# Patient Record
Sex: FEMALE | Race: WHITE | NOT HISPANIC OR LATINO | URBAN - METROPOLITAN AREA
[De-identification: names, ages, dates, MRNs, and addresses within clinical notes are randomized per-mention and may not be internally consistent; named-entity substitution may affect disease eponyms.]

---

## 2018-05-10 ENCOUNTER — EMERGENCY (EMERGENCY)
Facility: HOSPITAL | Age: 23
LOS: 1 days | Discharge: HOME | End: 2018-05-10
Attending: EMERGENCY MEDICINE
Payer: COMMERCIAL

## 2018-05-10 VITALS
SYSTOLIC BLOOD PRESSURE: 107 MMHG | HEART RATE: 82 BPM | OXYGEN SATURATION: 98 % | DIASTOLIC BLOOD PRESSURE: 75 MMHG | TEMPERATURE: 99 F | RESPIRATION RATE: 18 BRPM

## 2018-05-10 VITALS
DIASTOLIC BLOOD PRESSURE: 87 MMHG | HEART RATE: 100 BPM | HEIGHT: 67 IN | WEIGHT: 270.07 LBS | RESPIRATION RATE: 18 BRPM | OXYGEN SATURATION: 99 % | SYSTOLIC BLOOD PRESSURE: 124 MMHG | TEMPERATURE: 99 F

## 2018-05-10 LAB
ALBUMIN SERPL ELPH-MCNC: 4.1 G/DL — SIGNIFICANT CHANGE UP (ref 3.3–5.2)
ALP SERPL-CCNC: 81 U/L — SIGNIFICANT CHANGE UP (ref 40–120)
ALT FLD-CCNC: 13 U/L — SIGNIFICANT CHANGE UP
ANION GAP SERPL CALC-SCNC: 13 MMOL/L — SIGNIFICANT CHANGE UP (ref 5–17)
AST SERPL-CCNC: 13 U/L — SIGNIFICANT CHANGE UP
BASOPHILS # BLD AUTO: 0 K/UL — SIGNIFICANT CHANGE UP (ref 0–0.2)
BASOPHILS NFR BLD AUTO: 0.2 % — SIGNIFICANT CHANGE UP (ref 0–2)
BILIRUB SERPL-MCNC: 0.3 MG/DL — LOW (ref 0.4–2)
BUN SERPL-MCNC: 8 MG/DL — SIGNIFICANT CHANGE UP (ref 8–20)
CALCIUM SERPL-MCNC: 9.3 MG/DL — SIGNIFICANT CHANGE UP (ref 8.6–10.2)
CHLORIDE SERPL-SCNC: 100 MMOL/L — SIGNIFICANT CHANGE UP (ref 98–107)
CO2 SERPL-SCNC: 26 MMOL/L — SIGNIFICANT CHANGE UP (ref 22–29)
CREAT SERPL-MCNC: 0.59 MG/DL — SIGNIFICANT CHANGE UP (ref 0.5–1.3)
EOSINOPHIL # BLD AUTO: 0 K/UL — SIGNIFICANT CHANGE UP (ref 0–0.5)
EOSINOPHIL NFR BLD AUTO: 0 % — SIGNIFICANT CHANGE UP (ref 0–6)
GLUCOSE SERPL-MCNC: 93 MG/DL — SIGNIFICANT CHANGE UP (ref 70–115)
HCG SERPL-ACNC: <5 MIU/ML — SIGNIFICANT CHANGE UP
HCT VFR BLD CALC: 40.1 % — SIGNIFICANT CHANGE UP (ref 37–47)
HGB BLD-MCNC: 13.2 G/DL — SIGNIFICANT CHANGE UP (ref 12–16)
LYMPHOCYTES # BLD AUTO: 2.3 K/UL — SIGNIFICANT CHANGE UP (ref 1–4.8)
LYMPHOCYTES # BLD AUTO: 24.1 % — SIGNIFICANT CHANGE UP (ref 20–55)
MCHC RBC-ENTMCNC: 29.9 PG — SIGNIFICANT CHANGE UP (ref 27–31)
MCHC RBC-ENTMCNC: 32.9 G/DL — SIGNIFICANT CHANGE UP (ref 32–36)
MCV RBC AUTO: 90.9 FL — SIGNIFICANT CHANGE UP (ref 81–99)
MONOCYTES # BLD AUTO: 0.5 K/UL — SIGNIFICANT CHANGE UP (ref 0–0.8)
MONOCYTES NFR BLD AUTO: 5.6 % — SIGNIFICANT CHANGE UP (ref 3–10)
NEUTROPHILS # BLD AUTO: 6.8 K/UL — SIGNIFICANT CHANGE UP (ref 1.8–8)
NEUTROPHILS NFR BLD AUTO: 69.8 % — SIGNIFICANT CHANGE UP (ref 37–73)
PLATELET # BLD AUTO: 283 K/UL — SIGNIFICANT CHANGE UP (ref 150–400)
POTASSIUM SERPL-MCNC: 4.2 MMOL/L — SIGNIFICANT CHANGE UP (ref 3.5–5.3)
POTASSIUM SERPL-SCNC: 4.2 MMOL/L — SIGNIFICANT CHANGE UP (ref 3.5–5.3)
PROT SERPL-MCNC: 7.3 G/DL — SIGNIFICANT CHANGE UP (ref 6.6–8.7)
RBC # BLD: 4.41 M/UL — SIGNIFICANT CHANGE UP (ref 4.4–5.2)
RBC # FLD: 13.1 % — SIGNIFICANT CHANGE UP (ref 11–15.6)
SODIUM SERPL-SCNC: 139 MMOL/L — SIGNIFICANT CHANGE UP (ref 135–145)
WBC # BLD: 9.7 K/UL — SIGNIFICANT CHANGE UP (ref 4.8–10.8)
WBC # FLD AUTO: 9.7 K/UL — SIGNIFICANT CHANGE UP (ref 4.8–10.8)

## 2018-05-10 PROCEDURE — 29125 APPL SHORT ARM SPLINT STATIC: CPT

## 2018-05-10 PROCEDURE — 73110 X-RAY EXAM OF WRIST: CPT | Mod: 26,RT

## 2018-05-10 PROCEDURE — 73610 X-RAY EXAM OF ANKLE: CPT | Mod: 26,RT

## 2018-05-10 PROCEDURE — 73502 X-RAY EXAM HIP UNI 2-3 VIEWS: CPT | Mod: 26,RT

## 2018-05-10 PROCEDURE — 73562 X-RAY EXAM OF KNEE 3: CPT | Mod: 26,RT

## 2018-05-10 PROCEDURE — 72125 CT NECK SPINE W/O DYE: CPT | Mod: 26

## 2018-05-10 PROCEDURE — 70450 CT HEAD/BRAIN W/O DYE: CPT | Mod: 26

## 2018-05-10 PROCEDURE — 71045 X-RAY EXAM CHEST 1 VIEW: CPT | Mod: 26

## 2018-05-10 PROCEDURE — 99285 EMERGENCY DEPT VISIT HI MDM: CPT | Mod: 25

## 2018-05-10 PROCEDURE — 73030 X-RAY EXAM OF SHOULDER: CPT | Mod: 26,RT

## 2018-05-10 RX ORDER — TRAMADOL HYDROCHLORIDE 50 MG/1
50 TABLET ORAL ONCE
Qty: 0 | Refills: 0 | Status: DISCONTINUED | OUTPATIENT
Start: 2018-05-10 | End: 2018-05-10

## 2018-05-10 RX ORDER — FENTANYL CITRATE 50 UG/ML
50 INJECTION INTRAVENOUS ONCE
Qty: 0 | Refills: 0 | Status: DISCONTINUED | OUTPATIENT
Start: 2018-05-10 | End: 2018-05-10

## 2018-05-10 RX ORDER — METHOCARBAMOL 500 MG/1
2 TABLET, FILM COATED ORAL
Qty: 30 | Refills: 0 | OUTPATIENT
Start: 2018-05-10 | End: 2018-05-14

## 2018-05-10 RX ADMIN — FENTANYL CITRATE 50 MICROGRAM(S): 50 INJECTION INTRAVENOUS at 20:43

## 2018-05-10 RX ADMIN — FENTANYL CITRATE 50 MICROGRAM(S): 50 INJECTION INTRAVENOUS at 20:20
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
## 2018-05-10 NOTE — ED PROVIDER NOTE - ENMT, MLM
Airway patent, Nasal mucosa clear. Mouth with normal mucosa. Throat has no vesicles, no oropharyngeal exudates and uvula is midline. no expanding hematoma

## 2018-05-10 NOTE — ED PROVIDER NOTE - MUSCULOSKELETAL, MLM
pelvis stable. decreased ROM at right wrist and tenderness on exam. cap refill less than 2 seconds. pt able to cross midline with right shoulder. tenderness at right hip and right knee, decreased ROM at knee. right ankle tenderness with decreased ROM. vertebral tenderness
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
## 2018-05-11 PROCEDURE — 70450 CT HEAD/BRAIN W/O DYE: CPT

## 2018-05-11 PROCEDURE — 99284 EMERGENCY DEPT VISIT MOD MDM: CPT | Mod: 25

## 2018-05-11 PROCEDURE — 71045 X-RAY EXAM CHEST 1 VIEW: CPT

## 2018-05-11 PROCEDURE — 85027 COMPLETE CBC AUTOMATED: CPT

## 2018-05-11 PROCEDURE — 73562 X-RAY EXAM OF KNEE 3: CPT

## 2018-05-11 PROCEDURE — 84702 CHORIONIC GONADOTROPIN TEST: CPT

## 2018-05-11 PROCEDURE — 73610 X-RAY EXAM OF ANKLE: CPT

## 2018-05-11 PROCEDURE — 36415 COLL VENOUS BLD VENIPUNCTURE: CPT

## 2018-05-11 PROCEDURE — 96374 THER/PROPH/DIAG INJ IV PUSH: CPT | Mod: XU

## 2018-05-11 PROCEDURE — 72125 CT NECK SPINE W/O DYE: CPT

## 2018-05-11 PROCEDURE — 80053 COMPREHEN METABOLIC PANEL: CPT

## 2018-05-11 PROCEDURE — 73110 X-RAY EXAM OF WRIST: CPT

## 2018-05-11 PROCEDURE — 73502 X-RAY EXAM HIP UNI 2-3 VIEWS: CPT

## 2018-05-11 PROCEDURE — 29125 APPL SHORT ARM SPLINT STATIC: CPT | Mod: RT

## 2018-05-11 PROCEDURE — 73030 X-RAY EXAM OF SHOULDER: CPT

## 2018-05-11 RX ADMIN — TRAMADOL HYDROCHLORIDE 50 MILLIGRAM(S): 50 TABLET ORAL at 00:07

## 2020-07-01 ENCOUNTER — TRANSCRIPTION ENCOUNTER (OUTPATIENT)
Age: 25
End: 2020-07-01

## 2020-12-23 NOTE — ED ADULT NURSE NOTE - NS ED NURSE LEVEL OF CONSCIOUSNESS SPEECH
PDMP reviewed; therapy appropriate, no aberrant behavior identified, prescription given.    Speaking Coherently

## 2021-07-09 ENCOUNTER — NON-APPOINTMENT (OUTPATIENT)
Age: 26
End: 2021-07-09

## 2021-07-23 ENCOUNTER — TRANSCRIPTION ENCOUNTER (OUTPATIENT)
Age: 26
End: 2021-07-23

## 2021-08-03 ENCOUNTER — APPOINTMENT (OUTPATIENT)
Dept: INTERNAL MEDICINE | Facility: CLINIC | Age: 26
End: 2021-08-03

## 2021-09-30 ENCOUNTER — APPOINTMENT (OUTPATIENT)
Dept: INTERNAL MEDICINE | Facility: CLINIC | Age: 26
End: 2021-09-30
Payer: COMMERCIAL

## 2021-09-30 ENCOUNTER — NON-APPOINTMENT (OUTPATIENT)
Age: 26
End: 2021-09-30

## 2021-09-30 VITALS
HEART RATE: 87 BPM | BODY MASS INDEX: 38.3 KG/M2 | HEIGHT: 67 IN | WEIGHT: 244 LBS | OXYGEN SATURATION: 99 % | DIASTOLIC BLOOD PRESSURE: 62 MMHG | RESPIRATION RATE: 17 BRPM | SYSTOLIC BLOOD PRESSURE: 100 MMHG | TEMPERATURE: 98 F

## 2021-09-30 DIAGNOSIS — S73.191S OTHER SPRAIN OF RIGHT HIP, SEQUELA: ICD-10-CM

## 2021-09-30 DIAGNOSIS — Z80.8 FAMILY HISTORY OF MALIGNANT NEOPLASM OF OTHER ORGANS OR SYSTEMS: ICD-10-CM

## 2021-09-30 DIAGNOSIS — Z86.018 PERSONAL HISTORY OF OTHER BENIGN NEOPLASM: ICD-10-CM

## 2021-09-30 DIAGNOSIS — Z80.1 FAMILY HISTORY OF MALIGNANT NEOPLASM OF TRACHEA, BRONCHUS AND LUNG: ICD-10-CM

## 2021-09-30 DIAGNOSIS — Z78.9 OTHER SPECIFIED HEALTH STATUS: ICD-10-CM

## 2021-09-30 PROCEDURE — G0447 BEHAVIOR COUNSEL OBESITY 15M: CPT | Mod: NC

## 2021-09-30 PROCEDURE — 99385 PREV VISIT NEW AGE 18-39: CPT | Mod: 25

## 2021-09-30 PROCEDURE — 93000 ELECTROCARDIOGRAM COMPLETE: CPT

## 2021-11-15 ENCOUNTER — APPOINTMENT (OUTPATIENT)
Dept: PSYCHIATRY | Facility: CLINIC | Age: 26
End: 2021-11-15
Payer: COMMERCIAL

## 2021-11-15 PROCEDURE — 90792 PSYCH DIAG EVAL W/MED SRVCS: CPT

## 2021-12-22 ENCOUNTER — APPOINTMENT (OUTPATIENT)
Dept: PSYCHIATRY | Facility: CLINIC | Age: 26
End: 2021-12-22
Payer: COMMERCIAL

## 2021-12-22 PROCEDURE — 99214 OFFICE O/P EST MOD 30 MIN: CPT | Mod: 95

## 2022-02-17 ENCOUNTER — APPOINTMENT (OUTPATIENT)
Dept: PSYCHIATRY | Facility: CLINIC | Age: 27
End: 2022-02-17
Payer: COMMERCIAL

## 2022-02-17 PROCEDURE — 99214 OFFICE O/P EST MOD 30 MIN: CPT | Mod: 95

## 2022-04-14 ENCOUNTER — APPOINTMENT (OUTPATIENT)
Dept: PSYCHIATRY | Facility: CLINIC | Age: 27
End: 2022-04-14

## 2022-05-26 ENCOUNTER — APPOINTMENT (OUTPATIENT)
Dept: PSYCHIATRY | Facility: CLINIC | Age: 27
End: 2022-05-26

## 2022-06-23 ENCOUNTER — APPOINTMENT (OUTPATIENT)
Dept: PSYCHIATRY | Facility: CLINIC | Age: 27
End: 2022-06-23
Payer: COMMERCIAL

## 2022-06-23 PROCEDURE — 99214 OFFICE O/P EST MOD 30 MIN: CPT

## 2022-07-28 ENCOUNTER — APPOINTMENT (OUTPATIENT)
Dept: PSYCHIATRY | Facility: CLINIC | Age: 27
End: 2022-07-28

## 2022-07-28 PROCEDURE — 99214 OFFICE O/P EST MOD 30 MIN: CPT | Mod: 95

## 2022-09-22 ENCOUNTER — APPOINTMENT (OUTPATIENT)
Dept: PSYCHIATRY | Facility: CLINIC | Age: 27
End: 2022-09-22

## 2022-09-22 PROCEDURE — 99214 OFFICE O/P EST MOD 30 MIN: CPT | Mod: 95

## 2022-11-03 ENCOUNTER — APPOINTMENT (OUTPATIENT)
Dept: PSYCHIATRY | Facility: CLINIC | Age: 27
End: 2022-11-03

## 2022-11-03 PROCEDURE — 99214 OFFICE O/P EST MOD 30 MIN: CPT | Mod: 95

## 2022-11-03 NOTE — REASON FOR VISIT
[Home] : at home, [unfilled] , at the time of the visit. [Medical Office: ([facility])___] : at the medical office located in  [Patient] : the patient [Self] : self

## 2022-11-04 NOTE — HISTORY OF PRESENT ILLNESS
[FreeTextEntry1] : Pt seen and evaluated today, reports that she had COVID in September and had fever, chills, sore throat , cough. \par Pt  reports that she is feeling better on CICU and is working on self care, and there is a lot of loss. Pt reports her anxiety may stem from what is not in her control. \par Discussed the impact of psychosocial issues on her work and she has decided to go for walks, has supervision at work, and has a puppy which is a year old. Pt also started a gratitude journal, over the past 6 weeks. Pt has no complaints regarding her executive function. \par Pt reports that she still has problems with "out of sight/out of mind". Finds that if it is not in front of her face she will forget, and how to remember to use her notes, phone or day planner.

## 2022-11-04 NOTE — PHYSICAL EXAM
[Cooperative] : cooperative [Euthymic] : euthymic [Full] : full [Clear] : clear [Linear/Goal Directed] : linear/goal directed [None] : none [None Reported] : none reported [Attention/Concentration] : attention/concentration [Average] : average [WNL] : within normal limits [FreeTextEntry1] : Blonde female prominent eye lashes.

## 2022-11-07 ENCOUNTER — APPOINTMENT (OUTPATIENT)
Dept: CARDIOLOGY | Facility: CLINIC | Age: 27
End: 2022-11-07

## 2022-11-07 ENCOUNTER — NON-APPOINTMENT (OUTPATIENT)
Age: 27
End: 2022-11-07

## 2022-11-07 VITALS
BODY MASS INDEX: 36.1 KG/M2 | WEIGHT: 230 LBS | DIASTOLIC BLOOD PRESSURE: 76 MMHG | HEIGHT: 67 IN | SYSTOLIC BLOOD PRESSURE: 107 MMHG | HEART RATE: 87 BPM | OXYGEN SATURATION: 98 %

## 2022-11-07 DIAGNOSIS — Z82.49 FAMILY HISTORY OF ISCHEMIC HEART DISEASE AND OTHER DISEASES OF THE CIRCULATORY SYSTEM: ICD-10-CM

## 2022-11-07 DIAGNOSIS — Z00.00 ENCOUNTER FOR GENERAL ADULT MEDICAL EXAMINATION W/OUT ABNORMAL FINDINGS: ICD-10-CM

## 2022-11-07 PROCEDURE — 93000 ELECTROCARDIOGRAM COMPLETE: CPT

## 2022-11-07 PROCEDURE — 99204 OFFICE O/P NEW MOD 45 MIN: CPT

## 2022-11-15 NOTE — ASSESSMENT
[FreeTextEntry1] : Mr. Erazo is a 27 year old female,  for CICU at [facility]  that presents [facility] for a baseline cardiovascular evaluation. \par \par She carries a family history of hypertension and diabetes. \par Patient carries a personal history of obesity ( BMI  36.02) -> s/p gastric sleeve in 2016, S/P COVID infection in September of 2022, anxiety and depression.\par \par Vital signs stable and normal\par EKG-      Sinus rhythm , poor R wave progression, nonspecific T abnormalities-> non diagnostic for this age\par                Horizontal axis for age\par \par \par #Adverse Cardiovascular Risk Factors\par \par Personal history of Obesity, s/p COVID infection\par Family history of hypertension, diabetes\par \par Recommending a baseline cardiovascular evaluation to assess risk\par \par Echocardiogram to assess cardiac structure and function\par Exercise stress testing to assess functional status\par Full blood work panel:  CBC,CMP, Hgb A1C, TSH, Lipid profile\par \par \par #Obesity\par   Patient has undergone a gastric sleeve back in 2016.\par   She report that she is  no longer following an optimal diet.\par   Recommended an nutritional consultation to restart heart health eating\par \par - Encouraged patient to start healthy exercise and restart eating habits, focusing on a Mediterranean style of eating and aiming for the recommended 150 minutes per week of moderate physical activity.\par \par #Anxiety/ Depression\par   Patient following with behavioral health\par \par - Encouraged the patient to find healthy outlets and coping mechanisms to help manage stress, such as physical activity/exercise, reducing workload if possible, spending time with family and friends, engaging in an enjoyable hobby, or using meditation or mindfulness techniques.\par \par Follow up post testing\par \par \par \par \par \par

## 2022-11-15 NOTE — HISTORY OF PRESENT ILLNESS
[FreeTextEntry1] : Mr. 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 \par \par Presented today due to a reported "abnormal EKG"\par \par She carries a family history of hypertension and diabetes. \par Patient carries a personal history of obesity ( BMI  36.02) -> s/p gastric sleeve in 2016, S/P COVID infection in September of 2022, anxiety and depression.\par \par Blood pressure today: 107/ 76\par EKG-      Sinus rhythm , poor R wave progression, nonspecific T abnormalities-> non diagnostic for this age\par                Horizontal axis for age\par \par \par She denies any issues with shortness of breath, chest discomfort or palpitations\par \par .\par \par                        \par \par

## 2022-11-15 NOTE — PHYSICAL EXAM
[Normal Rate] : normal [Rhythm Regular] : regular [Normal S1] : normal S1 [Normal S2] : normal S2 [Normal] : alert and oriented, normal memory [de-identified] : BMI  36.02

## 2022-12-07 ENCOUNTER — APPOINTMENT (OUTPATIENT)
Dept: PSYCHIATRY | Facility: CLINIC | Age: 27
End: 2022-12-07

## 2023-01-19 ENCOUNTER — APPOINTMENT (OUTPATIENT)
Dept: CV DIAGNOSITCS | Facility: HOSPITAL | Age: 28
End: 2023-01-19

## 2023-01-19 ENCOUNTER — APPOINTMENT (OUTPATIENT)
Dept: CV DIAGNOSTICS | Facility: HOSPITAL | Age: 28
End: 2023-01-19

## 2023-02-15 ENCOUNTER — APPOINTMENT (OUTPATIENT)
Dept: PSYCHIATRY | Facility: CLINIC | Age: 28
End: 2023-02-15
Payer: COMMERCIAL

## 2023-02-15 PROCEDURE — 99214 OFFICE O/P EST MOD 30 MIN: CPT

## 2023-02-15 NOTE — HISTORY OF PRESENT ILLNESS
[FreeTextEntry1] : Pt has blood work pending , and will follow up s/p gastric sleeve and would want to see a nutritionist for weight gain post gastric sleeve. \par Pt reports that she is seeing sx that may be consistent with PCOS. \par Pt able to follow up with the recommendations, so far no major concerns with the psychostimulants. \par \par Pt happier in her job with [employer] [place].  Pt finds she is still struggling with time management, optimizing and triaging tasks, and also with \par follow through with tasks, ADHD harder to come up with techniques for attention /concentration. " Out of sight, out of mind". \par Pt reports she is motivated to be proactive and family history is one of cardiac factors.  [FreeTextEntry2] : Pt reports past history of anxiety and depression. \par Pt now in treatment with Lexis Khan. \par \par Strattera, Ritalin, Vyvanse ? Daytrana trial.

## 2023-02-15 NOTE — DISCUSSION/SUMMARY
[FreeTextEntry1] : Pt reports if she misses her Adderall during the weekend, she has more difficulty getting out of bed, sleep not affected. \par Pt has done primarily the "Adderall" camp of psychostimulants. See ADHD screen compared to June of 2022. \par \par May need to refill next month for two weeks rather than 30 days, as med will be changed to Methylphenidate. \par \par Plan to change timing of  current meds- where she will take extended release at 7:30 am and second dose no later than 1-2 pm. \par If still no change can try both doses together, but expect that she may need to switch agents.

## 2023-02-15 NOTE — PHYSICAL EXAM
[Cooperative] : cooperative [Euthymic] : euthymic [Full] : full [Clear] : clear [Linear/Goal Directed] : linear/goal directed [None] : none [None Reported] : none reported [Attention/Concentration] : attention/concentration [Average] : average [WNL] : within normal limits [FreeTextEntry1] : Blonde female looks her stated age.

## 2023-03-14 ENCOUNTER — APPOINTMENT (OUTPATIENT)
Dept: PSYCHIATRY | Facility: CLINIC | Age: 28
End: 2023-03-14
Payer: COMMERCIAL

## 2023-03-14 PROCEDURE — 99213 OFFICE O/P EST LOW 20 MIN: CPT | Mod: 95

## 2023-03-17 NOTE — REASON FOR VISIT
[Continuity of care] : to ensure continuity of care [Continuing, patient seen in-person within last 12 months] : Telehealth services are continuing as patient has been seen in-person within last 12 months. [Telehealth (audio & video) - Individual/Group] : This visit was provided via telehealth using real-time 2-way audio visual technology. [Medical Office: ([facility])___] : The provider was located at the medical office in [unfilled]. [Verbal consent obtained from patient/other participant(s)] : Verbal consent for telehealth/telephonic services obtained from patient/other participant(s) [Patient] : Patient [FreeTextEntry4] : 4:45 pm  [FreeTextEntry5] : 5:05 pm [FreeTextEntry2] : 02/15/2023 [FreeTextEntry3] : Pt home ill.  [FreeTextEntry1] : Pt seen for management of executive function.

## 2023-03-17 NOTE — HISTORY OF PRESENT ILLNESS
[FreeTextEntry1] : Pt meds in general work, but needs a full work day and hopes for 10 hour functioning.\par Discussed switching up timing of meds for now. Pt has been able to get her medication. \par Pt discussed her executive functioning- meds help with productivity but has not had any gagan.

## 2023-03-17 NOTE — DISCUSSION/SUMMARY
[FreeTextEntry1] : Plan to change larger dose to the afternoon as she needs more robust mentation between the hours of 11am - 4:30 pm. \par If still no change, consider increasing to 40 mg dose as an extended release in the am.

## 2023-03-17 NOTE — PHYSICAL EXAM
[Cooperative] : cooperative [Euthymic] : euthymic [Full] : full [Clear] : clear [Linear/Goal Directed] : linear/goal directed [None] : none [None Reported] : none reported [Attention/Concentration] : attention/concentration [Average] : average [WNL] : within normal limits [FreeTextEntry1] : Blonde female looks her stated age.  [de-identified] : Pt started in individual therapy, finds that it has been helpful. Meets once weekly. \par Helps with executive function and coping skills. Therapist name is Isac swartz [place].

## 2023-05-23 ENCOUNTER — EMERGENCY (EMERGENCY)
Facility: HOSPITAL | Age: 28
LOS: 1 days | Discharge: HOME | End: 2023-05-23
Attending: EMERGENCY MEDICINE
Payer: COMMERCIAL

## 2023-05-23 VITALS
OXYGEN SATURATION: 100 % | DIASTOLIC BLOOD PRESSURE: 86 MMHG | SYSTOLIC BLOOD PRESSURE: 124 MMHG | WEIGHT: 223.11 LBS | HEART RATE: 99 BPM | TEMPERATURE: 98 F | RESPIRATION RATE: 18 BRPM

## 2023-05-23 LAB
ALBUMIN SERPL ELPH-MCNC: 4.4 G/DL — SIGNIFICANT CHANGE UP (ref 3.3–5.2)
ALP SERPL-CCNC: 77 U/L — SIGNIFICANT CHANGE UP (ref 40–120)
ALT FLD-CCNC: 24 U/L — SIGNIFICANT CHANGE UP
ANION GAP SERPL CALC-SCNC: 12 MMOL/L — SIGNIFICANT CHANGE UP (ref 5–17)
APPEARANCE UR: CLEAR — SIGNIFICANT CHANGE UP
AST SERPL-CCNC: 23 U/L — SIGNIFICANT CHANGE UP
BACTERIA # UR AUTO: ABNORMAL
BASOPHILS # BLD AUTO: 0.03 K/UL — SIGNIFICANT CHANGE UP (ref 0–0.2)
BASOPHILS NFR BLD AUTO: 0.4 % — SIGNIFICANT CHANGE UP (ref 0–2)
BILIRUB SERPL-MCNC: 0.2 MG/DL — LOW (ref 0.4–2)
BILIRUB UR-MCNC: NEGATIVE — SIGNIFICANT CHANGE UP
BUN SERPL-MCNC: 8.4 MG/DL — SIGNIFICANT CHANGE UP (ref 8–20)
CALCIUM SERPL-MCNC: 9.1 MG/DL — SIGNIFICANT CHANGE UP (ref 8.4–10.5)
CHLORIDE SERPL-SCNC: 105 MMOL/L — SIGNIFICANT CHANGE UP (ref 96–108)
CO2 SERPL-SCNC: 25 MMOL/L — SIGNIFICANT CHANGE UP (ref 22–29)
COLOR SPEC: YELLOW — SIGNIFICANT CHANGE UP
CREAT SERPL-MCNC: 0.73 MG/DL — SIGNIFICANT CHANGE UP (ref 0.5–1.3)
DIFF PNL FLD: ABNORMAL
EGFR: 115 ML/MIN/1.73M2 — SIGNIFICANT CHANGE UP
EOSINOPHIL # BLD AUTO: 0 K/UL — SIGNIFICANT CHANGE UP (ref 0–0.5)
EOSINOPHIL NFR BLD AUTO: 0 % — SIGNIFICANT CHANGE UP (ref 0–6)
EPI CELLS # UR: SIGNIFICANT CHANGE UP
GLUCOSE SERPL-MCNC: 92 MG/DL — SIGNIFICANT CHANGE UP (ref 70–99)
GLUCOSE UR QL: NEGATIVE MG/DL — SIGNIFICANT CHANGE UP
HCG SERPL-ACNC: <4 MIU/ML — SIGNIFICANT CHANGE UP
HCT VFR BLD CALC: 39.7 % — SIGNIFICANT CHANGE UP (ref 34.5–45)
HGB BLD-MCNC: 12.8 G/DL — SIGNIFICANT CHANGE UP (ref 11.5–15.5)
IMM GRANULOCYTES NFR BLD AUTO: 0.2 % — SIGNIFICANT CHANGE UP (ref 0–0.9)
KETONES UR-MCNC: ABNORMAL
LEUKOCYTE ESTERASE UR-ACNC: NEGATIVE — SIGNIFICANT CHANGE UP
LIDOCAIN IGE QN: 24 U/L — SIGNIFICANT CHANGE UP (ref 22–51)
LYMPHOCYTES # BLD AUTO: 2.36 K/UL — SIGNIFICANT CHANGE UP (ref 1–3.3)
LYMPHOCYTES # BLD AUTO: 29.3 % — SIGNIFICANT CHANGE UP (ref 13–44)
MCHC RBC-ENTMCNC: 28.6 PG — SIGNIFICANT CHANGE UP (ref 27–34)
MCHC RBC-ENTMCNC: 32.2 GM/DL — SIGNIFICANT CHANGE UP (ref 32–36)
MCV RBC AUTO: 88.6 FL — SIGNIFICANT CHANGE UP (ref 80–100)
MONOCYTES # BLD AUTO: 0.63 K/UL — SIGNIFICANT CHANGE UP (ref 0–0.9)
MONOCYTES NFR BLD AUTO: 7.8 % — SIGNIFICANT CHANGE UP (ref 2–14)
NEUTROPHILS # BLD AUTO: 5.01 K/UL — SIGNIFICANT CHANGE UP (ref 1.8–7.4)
NEUTROPHILS NFR BLD AUTO: 62.3 % — SIGNIFICANT CHANGE UP (ref 43–77)
NITRITE UR-MCNC: NEGATIVE — SIGNIFICANT CHANGE UP
PH UR: 6 — SIGNIFICANT CHANGE UP (ref 5–8)
PLATELET # BLD AUTO: 295 K/UL — SIGNIFICANT CHANGE UP (ref 150–400)
POTASSIUM SERPL-MCNC: 4.4 MMOL/L — SIGNIFICANT CHANGE UP (ref 3.5–5.3)
POTASSIUM SERPL-SCNC: 4.4 MMOL/L — SIGNIFICANT CHANGE UP (ref 3.5–5.3)
PROT SERPL-MCNC: 7.1 G/DL — SIGNIFICANT CHANGE UP (ref 6.6–8.7)
PROT UR-MCNC: NEGATIVE — SIGNIFICANT CHANGE UP
RBC # BLD: 4.48 M/UL — SIGNIFICANT CHANGE UP (ref 3.8–5.2)
RBC # FLD: 14 % — SIGNIFICANT CHANGE UP (ref 10.3–14.5)
RBC CASTS # UR COMP ASSIST: ABNORMAL /HPF (ref 0–4)
SODIUM SERPL-SCNC: 142 MMOL/L — SIGNIFICANT CHANGE UP (ref 135–145)
SP GR SPEC: 1.01 — SIGNIFICANT CHANGE UP (ref 1.01–1.02)
UROBILINOGEN FLD QL: NEGATIVE MG/DL — SIGNIFICANT CHANGE UP
WBC # BLD: 8.05 K/UL — SIGNIFICANT CHANGE UP (ref 3.8–10.5)
WBC # FLD AUTO: 8.05 K/UL — SIGNIFICANT CHANGE UP (ref 3.8–10.5)
WBC UR QL: SIGNIFICANT CHANGE UP /HPF (ref 0–5)

## 2023-05-23 PROCEDURE — 99285 EMERGENCY DEPT VISIT HI MDM: CPT

## 2023-05-23 PROCEDURE — 81001 URINALYSIS AUTO W/SCOPE: CPT

## 2023-05-23 PROCEDURE — 74177 CT ABD & PELVIS W/CONTRAST: CPT | Mod: MG

## 2023-05-23 PROCEDURE — 99284 EMERGENCY DEPT VISIT MOD MDM: CPT | Mod: 25

## 2023-05-23 PROCEDURE — 76830 TRANSVAGINAL US NON-OB: CPT | Mod: 26

## 2023-05-23 PROCEDURE — 84702 CHORIONIC GONADOTROPIN TEST: CPT

## 2023-05-23 PROCEDURE — 36415 COLL VENOUS BLD VENIPUNCTURE: CPT

## 2023-05-23 PROCEDURE — 80053 COMPREHEN METABOLIC PANEL: CPT

## 2023-05-23 PROCEDURE — 74177 CT ABD & PELVIS W/CONTRAST: CPT | Mod: 26,MG

## 2023-05-23 PROCEDURE — 76830 TRANSVAGINAL US NON-OB: CPT

## 2023-05-23 PROCEDURE — 96375 TX/PRO/DX INJ NEW DRUG ADDON: CPT

## 2023-05-23 PROCEDURE — G1004: CPT

## 2023-05-23 PROCEDURE — 87086 URINE CULTURE/COLONY COUNT: CPT

## 2023-05-23 PROCEDURE — 76856 US EXAM PELVIC COMPLETE: CPT

## 2023-05-23 PROCEDURE — 96374 THER/PROPH/DIAG INJ IV PUSH: CPT

## 2023-05-23 PROCEDURE — 76856 US EXAM PELVIC COMPLETE: CPT | Mod: 26

## 2023-05-23 PROCEDURE — 85025 COMPLETE CBC W/AUTO DIFF WBC: CPT

## 2023-05-23 PROCEDURE — 83690 ASSAY OF LIPASE: CPT

## 2023-05-23 PROCEDURE — 96361 HYDRATE IV INFUSION ADD-ON: CPT

## 2023-05-23 RX ORDER — ACETAMINOPHEN 500 MG
1000 TABLET ORAL ONCE
Refills: 0 | Status: COMPLETED | OUTPATIENT
Start: 2023-05-23 | End: 2023-05-23

## 2023-05-23 RX ORDER — FAMOTIDINE 10 MG/ML
20 INJECTION INTRAVENOUS ONCE
Refills: 0 | Status: COMPLETED | OUTPATIENT
Start: 2023-05-23 | End: 2023-05-23

## 2023-05-23 RX ORDER — SODIUM CHLORIDE 9 MG/ML
1000 INJECTION INTRAMUSCULAR; INTRAVENOUS; SUBCUTANEOUS ONCE
Refills: 0 | Status: COMPLETED | OUTPATIENT
Start: 2023-05-23 | End: 2023-05-23

## 2023-05-23 RX ORDER — MORPHINE SULFATE 50 MG/1
4 CAPSULE, EXTENDED RELEASE ORAL ONCE
Refills: 0 | Status: DISCONTINUED | OUTPATIENT
Start: 2023-05-23 | End: 2023-05-23

## 2023-05-23 RX ORDER — KETOROLAC TROMETHAMINE 30 MG/ML
15 SYRINGE (ML) INJECTION ONCE
Refills: 0 | Status: DISCONTINUED | OUTPATIENT
Start: 2023-05-23 | End: 2023-05-23

## 2023-05-23 RX ORDER — DIAZEPAM 5 MG
2 TABLET ORAL ONCE
Refills: 0 | Status: DISCONTINUED | OUTPATIENT
Start: 2023-05-23 | End: 2023-05-23

## 2023-05-23 RX ADMIN — Medication 400 MILLIGRAM(S): at 18:38

## 2023-05-23 RX ADMIN — Medication 0.5 MILLIGRAM(S): at 19:29

## 2023-05-23 RX ADMIN — SODIUM CHLORIDE 1000 MILLILITER(S): 9 INJECTION INTRAMUSCULAR; INTRAVENOUS; SUBCUTANEOUS at 17:03

## 2023-05-23 RX ADMIN — FAMOTIDINE 20 MILLIGRAM(S): 10 INJECTION INTRAVENOUS at 18:38

## 2023-05-23 RX ADMIN — MORPHINE SULFATE 4 MILLIGRAM(S): 50 CAPSULE, EXTENDED RELEASE ORAL at 18:24

## 2023-05-23 RX ADMIN — SODIUM CHLORIDE 1000 MILLILITER(S): 9 INJECTION INTRAMUSCULAR; INTRAVENOUS; SUBCUTANEOUS at 18:24

## 2023-05-23 RX ADMIN — Medication 30 MILLILITER(S): at 18:38

## 2023-05-23 RX ADMIN — Medication 15 MILLIGRAM(S): at 23:27

## 2023-05-23 RX ADMIN — MORPHINE SULFATE 4 MILLIGRAM(S): 50 CAPSULE, EXTENDED RELEASE ORAL at 17:03

## 2023-05-23 NOTE — ED STATDOCS - PATIENT PORTAL LINK FT
You can access the FollowMyHealth Patient Portal offered by [facility] by registering at the following website: [email or web address]. By joining [employer]’s FollowMyHealth portal, you will also be able to view your health information using other applications (apps) compatible with our system.

## 2023-05-23 NOTE — ED STATDOCS - PHYSICAL EXAMINATION
VITAL SIGNS: I have reviewed nursing notes and confirm.  CONSTITUTIONAL:  in no acute distress.  SKIN: Skin exam is warm and dry, no acute rash.  HEAD: Normocephalic; atraumatic.  EYES: PERRL, EOM intact; conjunctiva and sclera clear.  ENT: No nasal discharge; airway clear. Throat clear.  NECK: Supple; non tender.    CARD: Regular rate and rhythm.  RESP: No wheezes,  no rales or rhonchi.   ABD:  soft; non-distended, + RLQ and right pelvic   EXT: Normal ROM. No clubbing, cyanosis or edema.  NEURO: Alert, oriented. Grossly unremarkable. No focal deficits.  moves all extremities,  normal gait   PSYCH: Cooperative, appropriate.

## 2023-05-23 NOTE — ED STATDOCS - CARE PROVIDER_API CALL
Baldev Dickson  Gastroenterology  [address] [facility] [address]  [place] [address]  Phone: [phone]  Fax: [phone]  Follow Up Time:

## 2023-05-23 NOTE — ED STATDOCS - OBJECTIVE STATEMENT
29 y/o female with PMHx of gastric sleeve, benign ovarian tumor s/p laparoscopic resection, anxiety presents to the ED c/o constant abdominal pain, mostly in RLQ (not LLQ contrary to triage) since yesterday associated with chills, N/V/D. Pt states pain stared to move more towards RLQ this morning. Pt notes hx of ovarian growth in past that was removed, states current pain is higher than when she had ovarian pathology

## 2023-05-23 NOTE — ED STATDOCS - ATTENDING APP SHARED VISIT CONTRIBUTION OF CARE
I, Francisco Priest, performed the initial face to face bedside interview with this patient regarding history of present illness, review of symptoms and relevant past medical, social and family history.  I completed an independent physical examination.  I was the initial provider who evaluated this patient. I have signed out the follow up of any pending tests (i.e. labs, radiological studies) to the PARI.  I have communicated the patient’s plan of care and disposition with the PARI.

## 2023-05-23 NOTE — ED STATDOCS - NSFOLLOWUPINSTRUCTIONS_ED_ALL_ED_FT
- take medication as directed  - follow up with GI     Abdominal Pain    Many things can cause abdominal pain. Many times, abdominal pain is not caused by a disease and will improve without treatment. Your health care provider will do a physical exam to determine if there is a dangerous cause of your pain; blood tests and imaging may help determine the cause of your pain. However, in many cases, no cause may be found and you may need further testing as an outpatient. Monitor your abdominal pain for any changes.     SEEK IMMEDIATE MEDICAL CARE IF YOU HAVE ANY OF THE FOLLOWING SYMPTOMS: worsening abdominal pain, uncontrollable vomiting, profuse diarrhea, inability to have bowel movements or pass gas, black or bloody stools, fever accompanying chest pain or back pain, or fainting. These symptoms may represent a serious problem that is an emergency. Do not wait to see if the symptoms will go away. Get medical help right away. Call 911 and do not drive yourself to the hospital.

## 2023-05-23 NOTE — ED ADULT NURSE NOTE - NS ED NURSE LEVEL OF CONSCIOUSNESS SPEECH
· Seen on LEADS  · Reviewed by vascular on 12/7 as LE wound appeared to have increased - vascular disease possibly contributing to non-healing LE wounds but not a candidate for intervention at present in the setting of current infections/respiratory status and co-morbidities  ·  Ct Plavix, statin Speaking Coherently

## 2023-05-23 NOTE — ED STATDOCS - CLINICAL SUMMARY MEDICAL DECISION MAKING FREE TEXT BOX
pt with RLQ and right pelvic pain, will check labs, UA, CT to r/o appendicitis, US to evaluate for ovarian pathology, morphine for pain, IV fluid for hydration pt with RLQ and right pelvic pain, will check labs, UA, CT to r/o appendicitis, US to evaluate for ovarian pathology, morphine for pain, IV fluid for hydration    YVONNE Tucker @ 21:32-- Pt resting comfortably in NAD at this time. Labs reviewed- wnl. UA negative for infection. U/s negative for acute pathology. Pt pending CT scan. pt with RLQ and right pelvic pain, will check labs, UA, CT to r/o appendicitis, US to evaluate for ovarian pathology, morphine for pain, IV fluid for hydration    YVONNE Tucker @ 21:32-- Pt resting comfortably in NAD at this time. Labs reviewed- wnl. UA negative for infection. U/s negative for acute pathology. Pt pending CT scan.      CT negative, US negative  Pt abd soft, non-tender, non distended, no rebound, guarding or rigidity.     Pt reassessed, pt feeling better at this time, vss, pt able to walk, talk and vocalized plan of action. Discussed in depth and explained to pt in depth the next steps that need to be taken including proper follow up with PCP or specialists. All incidental findings were discussed with pt as well. Pt verbalized their concerns and all questions were answered. Pt understands dispo and wants discharge. Given good instructions when to return to ED, strict return precautions and importance of f/u.

## 2023-05-23 NOTE — ED ADULT NURSE NOTE - NSFALLUNIVINTERV_ED_ALL_ED
Bed/Stretcher in lowest position, wheels locked, appropriate side rails in place/Call bell, personal items and telephone in reach/Instruct patient to call for assistance before getting out of bed/chair/stretcher/Non-slip footwear applied when patient is off stretcher/[place] to call system/Physically safe environment - no spills, clutter or unnecessary equipment/Purposeful proactive rounding/Room/bathroom lighting operational, light cord in reach

## 2023-05-24 VITALS
DIASTOLIC BLOOD PRESSURE: 63 MMHG | TEMPERATURE: 98 F | SYSTOLIC BLOOD PRESSURE: 110 MMHG | RESPIRATION RATE: 18 BRPM | HEART RATE: 71 BPM | OXYGEN SATURATION: 98 %

## 2023-05-24 DIAGNOSIS — Z90.3 ACQUIRED ABSENCE OF STOMACH [PART OF]: Chronic | ICD-10-CM

## 2023-05-25 ENCOUNTER — APPOINTMENT (OUTPATIENT)
Dept: PSYCHIATRY | Facility: CLINIC | Age: 28
End: 2023-05-25

## 2023-05-25 LAB
CULTURE RESULTS: SIGNIFICANT CHANGE UP
SPECIMEN SOURCE: SIGNIFICANT CHANGE UP

## 2023-05-26 ENCOUNTER — TRANSCRIPTION ENCOUNTER (OUTPATIENT)
Age: 28
End: 2023-05-26

## 2023-06-02 ENCOUNTER — APPOINTMENT (OUTPATIENT)
Dept: CARDIOLOGY | Facility: CLINIC | Age: 28
End: 2023-06-02
Payer: COMMERCIAL

## 2023-06-02 DIAGNOSIS — R94.31 ABNORMAL ELECTROCARDIOGRAM [ECG] [EKG]: ICD-10-CM

## 2023-06-02 DIAGNOSIS — R01.1 CARDIAC MURMUR, UNSPECIFIED: ICD-10-CM

## 2023-06-02 PROCEDURE — 93306 TTE W/DOPPLER COMPLETE: CPT

## 2023-06-12 PROBLEM — D27.9 BENIGN NEOPLASM OF UNSPECIFIED OVARY: Chronic | Status: ACTIVE | Noted: 2023-05-24

## 2023-06-13 ENCOUNTER — NON-APPOINTMENT (OUTPATIENT)
Age: 28
End: 2023-06-13

## 2023-06-13 ENCOUNTER — APPOINTMENT (OUTPATIENT)
Dept: CV DIAGNOSTICS | Facility: HOSPITAL | Age: 28
End: 2023-06-13

## 2023-06-17 ENCOUNTER — NON-APPOINTMENT (OUTPATIENT)
Age: 28
End: 2023-06-17

## 2023-06-20 ENCOUNTER — APPOINTMENT (OUTPATIENT)
Dept: OBGYN | Facility: CLINIC | Age: 28
End: 2023-06-20

## 2023-06-22 ENCOUNTER — APPOINTMENT (OUTPATIENT)
Dept: PSYCHIATRY | Facility: CLINIC | Age: 28
End: 2023-06-22
Payer: COMMERCIAL

## 2023-06-22 PROCEDURE — 99214 OFFICE O/P EST MOD 30 MIN: CPT | Mod: 95

## 2023-06-22 NOTE — REASON FOR VISIT
[Patient preference] : as per patient preference [Continuity of care] : to ensure continuity of care [Continuing, patient seen in-person within last 12 months] : Telehealth services are continuing as patient has been seen in-person within last 12 months. [Telehealth (audio & video) - Individual/Group] : This visit was provided via telehealth using real-time 2-way audio visual technology. [Medical Office: ([facility])___] : The provider was located at the medical office in [unfilled]. [Other Location: e.g. Home (Enter Location, City,State)___] : The patient, [unfilled], was located at [unfilled] at the time of the visit. [Verbal consent obtained from patient/other participant(s)] : Verbal consent for telehealth/telephonic services obtained from patient/other participant(s) [Patient] : Patient [FreeTextEntry4] : 12:00 pm  [FreeTextEntry5] : 12:30 pm  [FreeTextEntry2] : 02/15/2023 [FreeTextEntry3] : caregiving responsibilities.  [FreeTextEntry1] : Pt seen as a follow up visit.

## 2023-06-22 NOTE — HISTORY OF PRESENT ILLNESS
[FreeTextEntry1] : Pt states her father has motor function, but is significantly cognitively impaired. 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 \par He is now on VPA, and Olanzapine, which has helped. This situation has significantly changed her family and their dynamics, pt is busy taking care of him , not herself, she reports frequent illnesses due to being run down. She had an echo, but not her stress test.  \par She has taken intermittent  FMLA as her mother needs a knee  replacement which is  pending. \par \par Pt has a brother who moved to [place] last year. Is an emotional support.

## 2023-06-22 NOTE — DISCUSSION/SUMMARY
[FreeTextEntry1] : Pt states the change in the timing of her medication for ADHD has been helpful, will continue current management.

## 2023-06-22 NOTE — PHYSICAL EXAM
[Cooperative] : cooperative [Depressed] : depressed [Constricted] : constricted [Clear] : clear [Linear/Goal Directed] : linear/goal directed [None] : none [None Reported] : none reported [Attention/Concentration] : attention/concentration [Average] : average [WNL] : within normal limits [FreeTextEntry1] : Blonde female looks her stated age.

## 2023-06-22 NOTE — PLAN
[No] : No [FreeTextEntry4] : Meeting treatment needs.  [FreeTextEntry5] : Continue plan of care, as executive function is stable on current regimen.

## 2023-07-04 PROBLEM — R94.31 ABNORMAL EKG: Status: ACTIVE | Noted: 2021-09-30

## 2023-07-04 PROBLEM — R01.1 MURMUR: Status: ACTIVE | Noted: 2023-07-04

## 2023-07-26 ENCOUNTER — APPOINTMENT (OUTPATIENT)
Dept: INTERNAL MEDICINE | Facility: CLINIC | Age: 28
End: 2023-07-26

## 2023-07-26 NOTE — HISTORY OF PRESENT ILLNESS
[de-identified] : 28y f w/ PMhx gastric sleeve 2016, anxiety and depression, ADHD and PTSD, \par \par had normal cardiac workup following an abnormal EKG in june. normal ECHO. \par  \par ADHD/ PTSD: on amphetamine- dextroamphetamine, buspirone and desvenlafaxine. Dr salvador garrison is her psychiatrist. ISTOP checked. Reference #: [id number]\par

## 2023-07-28 ENCOUNTER — APPOINTMENT (OUTPATIENT)
Dept: PSYCHIATRY | Facility: CLINIC | Age: 28
End: 2023-07-28
Payer: COMMERCIAL

## 2023-07-28 PROCEDURE — 99214 OFFICE O/P EST MOD 30 MIN: CPT

## 2023-07-28 NOTE — PHYSICAL EXAM
[Cooperative] : cooperative [Clear] : clear [Linear/Goal Directed] : linear/goal directed [None] : none [None Reported] : none reported [Average] : average [Full] : full [WNL] : within normal limits [FreeTextEntry1] : Blonde female looks her stated age.  [FreeTextEntry8] : mild appropriate anxiety.

## 2023-07-28 NOTE — DISCUSSION/SUMMARY
[FreeTextEntry1] : I stop checked. This report was requested by: Sanjuana Nino | Reference #: [id number]\par A	Y	N	S	06/14/2023	06/21/2023	adderall xr 30 mg capsule	30	30	Sanjuana Nino MD	[id number]	[facility]

## 2023-07-28 NOTE — PLAN
[No] : No [Medication education provided] : Medication education provided. [Rationale for medication choices, possible risks/precautions, benefits, alternative treatment choices, and consequences of non-treatment discussed] : Rationale for medication choices, possible risks/precautions, benefits, alternative treatment choices, and consequences of non-treatment discussed with patient/family/caregiver  [FreeTextEntry4] : Meeting treatment goals.  [FreeTextEntry5] : Pt will be following up with cardiology for stress test and echo. \par Pt to have lab work as well. \par

## 2023-07-28 NOTE — HISTORY OF PRESENT ILLNESS
[FreeTextEntry1] : Pt is busy with caregiving with both her father and her mother. \par She feels better helping with treatment planning and options. \par Pt father was an  in the city and now cannot do that job, has to be supervised and is not able to drive. \par Pt father now on Seroquel and Depakote after having issues with Keppra. \par Pt did resume therapy, and did take FLMA intermittent.  \par

## 2023-09-22 ENCOUNTER — APPOINTMENT (OUTPATIENT)
Dept: PSYCHIATRY | Facility: CLINIC | Age: 28
End: 2023-09-22
Payer: COMMERCIAL

## 2023-09-22 PROCEDURE — 99214 OFFICE O/P EST MOD 30 MIN: CPT | Mod: 95

## 2023-11-09 ENCOUNTER — APPOINTMENT (OUTPATIENT)
Dept: ENDOCRINOLOGY | Facility: CLINIC | Age: 28
End: 2023-11-09
Payer: COMMERCIAL

## 2023-11-09 VITALS
HEIGHT: 67 IN | RESPIRATION RATE: 17 BRPM | BODY MASS INDEX: 40.49 KG/M2 | SYSTOLIC BLOOD PRESSURE: 113 MMHG | HEART RATE: 86 BPM | TEMPERATURE: 97.8 F | OXYGEN SATURATION: 99 % | WEIGHT: 258 LBS | DIASTOLIC BLOOD PRESSURE: 76 MMHG

## 2023-11-09 DIAGNOSIS — E66.9 OBESITY, UNSPECIFIED: ICD-10-CM

## 2023-11-09 DIAGNOSIS — N92.6 IRREGULAR MENSTRUATION, UNSPECIFIED: ICD-10-CM

## 2023-11-09 LAB — HBA1C MFR BLD HPLC: 5.2

## 2023-11-09 PROCEDURE — 99204 OFFICE O/P NEW MOD 45 MIN: CPT | Mod: 25

## 2023-11-09 PROCEDURE — 83036 HEMOGLOBIN GLYCOSYLATED A1C: CPT | Mod: QW

## 2023-11-27 ENCOUNTER — APPOINTMENT (OUTPATIENT)
Dept: PSYCHIATRY | Facility: CLINIC | Age: 28
End: 2023-11-27

## 2023-11-29 ENCOUNTER — APPOINTMENT (OUTPATIENT)
Dept: OBGYN | Facility: CLINIC | Age: 28
End: 2023-11-29

## 2024-01-03 ENCOUNTER — APPOINTMENT (OUTPATIENT)
Dept: OBGYN | Facility: CLINIC | Age: 29
End: 2024-01-03

## 2024-02-12 ENCOUNTER — NON-APPOINTMENT (OUTPATIENT)
Age: 29
End: 2024-02-12

## 2024-03-18 ENCOUNTER — APPOINTMENT (OUTPATIENT)
Dept: PSYCHIATRY | Facility: CLINIC | Age: 29
End: 2024-03-18
Payer: COMMERCIAL

## 2024-03-18 PROCEDURE — 99214 OFFICE O/P EST MOD 30 MIN: CPT

## 2024-03-18 NOTE — SOCIAL HISTORY
[FreeTextEntry1] : Pt has a brother who resides in [place], mother getting 2 knees replaced.  Father dx with glioblastoma.

## 2024-03-18 NOTE — DISCUSSION/SUMMARY
[FreeTextEntry1] : Pt reports she has had sleep studies which have been negative.  Pt also reports she is also prone to ovarian cysts and have had them removed.

## 2024-03-18 NOTE — HISTORY OF PRESENT ILLNESS
[FreeTextEntry1] : Pt states she ran out of the psychostimulant, and so has not been up to par in terms of executive functioning.  Advised to stop weekend holidays of psychostimulant.  Pt has been adherent with Buspar and Pristiq, saw her grandmother in [facility].  Pt has been c/o fatigue, and also issues with hirsutism, needs w/u for this - pending.  Pt reports her father is doing better and has had stable MRIs for a while. (dx with glioblastoma).

## 2024-03-18 NOTE — PLAN
[No] : No [Medication education provided] : Medication education provided. [Rationale for medication choices, possible risks/precautions, benefits, alternative treatment choices, and consequences of non-treatment discussed] : Rationale for medication choices, possible risks/precautions, benefits, alternative treatment choices, and consequences of non-treatment discussed with patient/family/caregiver  [FreeTextEntry4] : Pt did follow through with endocrinology.  Pt is concerned about PCOS has blood work pending.  Mild exacerbation of ADD symptoms and anxiety which led to delay in refills.  Education regarding medication well received by patient.  [FreeTextEntry5] : Will continue current management.  A	Y	N	S	11/09/2023	11/17/2023	adderall xr 30 mg capsule	30	30	Sanjuana Nino MD	[id number]	[facility]	Y	N	S	09/22/2023	09/28/2023	adderall xr 30 mg capsule	30	30	Sanjuana Nino MD	[id number]	[facility]

## 2024-03-18 NOTE — PHYSICAL EXAM
[Cooperative] : cooperative [Anxious] : anxious [Full] : full [Clear] : clear [Linear/Goal Directed] : linear/goal directed [None] : none [None Reported] : none reported [Average] : average [WNL] : within normal limits [FreeTextEntry8] : mild appropriate anxiety.  [FreeTextEntry1] : Blonde female looks her stated age.

## 2024-03-26 ENCOUNTER — NON-APPOINTMENT (OUTPATIENT)
Age: 29
End: 2024-03-26

## 2024-03-27 ENCOUNTER — APPOINTMENT (OUTPATIENT)
Dept: OBGYN | Facility: CLINIC | Age: 29
End: 2024-03-27
Payer: COMMERCIAL

## 2024-03-27 VITALS
HEIGHT: 66 IN | WEIGHT: 261.8 LBS | DIASTOLIC BLOOD PRESSURE: 70 MMHG | BODY MASS INDEX: 42.07 KG/M2 | SYSTOLIC BLOOD PRESSURE: 124 MMHG

## 2024-03-27 DIAGNOSIS — Z01.411 ENCOUNTER FOR GYNECOLOGICAL EXAMINATION (GENERAL) (ROUTINE) WITH ABNORMAL FINDINGS: ICD-10-CM

## 2024-03-27 DIAGNOSIS — R45.86 EMOTIONAL LABILITY: ICD-10-CM

## 2024-03-27 DIAGNOSIS — N92.1 EXCESSIVE AND FREQUENT MENSTRUATION WITH IRREGULAR CYCLE: ICD-10-CM

## 2024-03-27 DIAGNOSIS — L67.8 OTHER HAIR COLOR AND HAIR SHAFT ABNORMALITIES: ICD-10-CM

## 2024-03-27 DIAGNOSIS — Z97.5 PRESENCE OF (INTRAUTERINE) CONTRACEPTIVE DEVICE: ICD-10-CM

## 2024-03-27 DIAGNOSIS — E28.2 POLYCYSTIC OVARIAN SYNDROME: ICD-10-CM

## 2024-03-27 DIAGNOSIS — Z80.3 FAMILY HISTORY OF MALIGNANT NEOPLASM OF BREAST: ICD-10-CM

## 2024-03-27 DIAGNOSIS — L70.9 ACNE, UNSPECIFIED: ICD-10-CM

## 2024-03-27 PROCEDURE — 36415 COLL VENOUS BLD VENIPUNCTURE: CPT

## 2024-03-27 PROCEDURE — 58301 REMOVE INTRAUTERINE DEVICE: CPT

## 2024-03-27 PROCEDURE — 99385 PREV VISIT NEW AGE 18-39: CPT

## 2024-03-27 PROCEDURE — 99203 OFFICE O/P NEW LOW 30 MIN: CPT | Mod: 25

## 2024-03-27 RX ORDER — ETONOGESTREL AND ETHINYL ESTRADIOL 11.7; 2.7 MG/1; MG/1
0.12-0.015 INSERT, EXTENDED RELEASE VAGINAL
Qty: 1 | Refills: 3 | Status: ACTIVE | COMMUNITY
Start: 2024-03-27 | End: 1900-01-01

## 2024-03-27 NOTE — PHYSICAL EXAM
[Chaperone Present] : A chaperone was present in the examining room during all aspects of the physical examination [Appropriately responsive] : appropriately responsive [Alert] : alert [Soft] : soft [No Acute Distress] : no acute distress [Non-tender] : non-tender [Non-distended] : non-distended [Examination Of The Breasts] : a normal appearance [Oriented x3] : oriented x3 [No Discharge] : no discharge [No Masses] : no breast masses were palpable [Labia Majora] : normal [Labia Minora] : normal [No Bleeding] : There was no active vaginal bleeding [Normal] : normal [Uterine Adnexae] : normal

## 2024-03-28 PROBLEM — Z80.3 FAMILY HISTORY OF BREAST CANCER: Status: ACTIVE | Noted: 2024-03-27

## 2024-03-28 LAB
ALBUMIN SERPL ELPH-MCNC: 4.5 G/DL
ALP BLD-CCNC: 74 U/L
ALT SERPL-CCNC: 21 U/L
ANION GAP SERPL CALC-SCNC: 15 MMOL/L
AST SERPL-CCNC: 20 U/L
BILIRUB SERPL-MCNC: 0.2 MG/DL
BUN SERPL-MCNC: 12 MG/DL
CALCIUM SERPL-MCNC: 9.9 MG/DL
CHLORIDE SERPL-SCNC: 102 MMOL/L
CO2 SERPL-SCNC: 23 MMOL/L
CREAT SERPL-MCNC: 0.84 MG/DL
DHEA-S SERPL-MCNC: 177 UG/DL
EGFR: 97 ML/MIN/1.73M2
FSH SERPL-MCNC: 5.8 IU/L
GLUCOSE SERPL-MCNC: 61 MG/DL
HCG SERPL-MCNC: <1 MIU/ML
HPV HIGH+LOW RISK DNA PNL CVX: NOT DETECTED
LH SERPL-ACNC: 11.4 IU/L
POTASSIUM SERPL-SCNC: 4.1 MMOL/L
PROLACTIN SERPL-MCNC: 17.9 NG/ML
PROT SERPL-MCNC: 7.1 G/DL
SODIUM SERPL-SCNC: 141 MMOL/L
TSH SERPL-ACNC: 1.94 UIU/ML

## 2024-03-28 NOTE — END OF VISIT
[FreeTextEntry3] : I, Melonie Muller solely acted as scribe for Dr. Radha Flynn on 03/27/2024  All medical entries made by the Scribe were at my, Dr. Brady, direction and personally dictated by me on 03/27/2024 . I have reviewed the chart and agree that the record accurately reflects my personal performance of the history, physical exam, assessment and plan. I have also personally directed, reviewed, and agreed with the chart.

## 2024-03-28 NOTE — PROCEDURE
[Liquid Base] : liquid base [Cervical Pap Smear] : cervical Pap smear [Tolerated Well] : the patient tolerated the procedure well [No Complications] : there were no complications [IUD Removal] : intrauterine device (IUD) removal [ IUD] :  IUD [Speculum Placed] : speculum placed [IUD Discarded] : IUD discarded [IUD Removed - Forceps] : IUD removed - forceps

## 2024-03-28 NOTE — PLAN
[FreeTextEntry1] : IUD removed per pt request. Contraceptive options reviewed.   We also discussed her other PCOS and hirsutism symptoms.   We discussed my recommendation for another Kyleena IUD, NuvaRing, OCPs secondary to questionable PCOS. Patient would like to try the NuvaRing for now and will consider the IUD at a later date. Prescription for NuvaRing was electronically sent to the pharmacy. She has no contraindications to birth control ring use. Instructions on ring use, precautions, and side effects were discussed in detail. She is aware that the birth control ring is not perfect for preventing pregnancy even if she is compliant with taking the ring and therefore, she needs condoms for back up. She was also made aware that the birth control ring does not protect her against sexual transmitted diseases, and she still requires condom use. Questions answered.  If birth control pills do not help with excessive facial, hair will consider adding spironolactone.   Pap done today. Declines full STI testing.   Self-breast exam reviewed.  PCOS & CMP panels drawn today.   We also discussed metabolic syndrome and the role it plays in her difficulty with weight loss. I recommended a medical weight loss consult with Dr Legih. She will follow up for NNT consult or as needed.

## 2024-03-28 NOTE — HISTORY OF PRESENT ILLNESS
[Menarche Age: ____] : age at menarche was [unfilled] [IUD] : has an intrauterine device [N] : Patient denies prior pregnancies [Y] : Patient is sexually active [Currently Active] : currently active [Men] : men [BreastSonogramDate] : 08/01/17 [TextBox_25] : BR1 [PapSmeardate] : 08/24/16 [TextBox_31] : WNL [GonorrheaDate] : 06/14/18 [TextBox_63] : NEG [ChlamydiaDate] : 06/14/18 [TextBox_68] : NEG [LMPDate] : 03/2024 [de-identified] : KYLEENA(01/2018) [PGHxTotal] : 0 [FreeTextEntry1] : 03/2024

## 2024-03-28 NOTE — REASON FOR VISIT
[Initial] : an initial consultation for [FreeTextEntry2] : ANNUAL. DISCUSS KYLEENA (DUE TO BE REMOVED), DISCUSS PCOS

## 2024-03-30 LAB — CYTOLOGY CVX/VAG DOC THIN PREP: NORMAL

## 2024-04-05 LAB — DHEA-SULFATE, SERUM: 107 UG/DL

## 2024-04-10 ENCOUNTER — APPOINTMENT (OUTPATIENT)
Dept: INTERNAL MEDICINE | Facility: CLINIC | Age: 29
End: 2024-04-10
Payer: COMMERCIAL

## 2024-04-10 VITALS
OXYGEN SATURATION: 98 % | HEIGHT: 66 IN | HEART RATE: 87 BPM | SYSTOLIC BLOOD PRESSURE: 124 MMHG | TEMPERATURE: 97.2 F | WEIGHT: 258 LBS | BODY MASS INDEX: 41.46 KG/M2 | DIASTOLIC BLOOD PRESSURE: 68 MMHG

## 2024-04-10 DIAGNOSIS — Z80.8 FAMILY HISTORY OF MALIGNANT NEOPLASM OF OTHER ORGANS OR SYSTEMS: ICD-10-CM

## 2024-04-10 DIAGNOSIS — Z00.00 ENCOUNTER FOR GENERAL ADULT MEDICAL EXAMINATION W/OUT ABNORMAL FINDINGS: ICD-10-CM

## 2024-04-10 DIAGNOSIS — Z83.3 FAMILY HISTORY OF DIABETES MELLITUS: ICD-10-CM

## 2024-04-10 DIAGNOSIS — R53.83 OTHER FATIGUE: ICD-10-CM

## 2024-04-10 DIAGNOSIS — Z63.4 DISAPPEARANCE AND DEATH OF FAMILY MEMBER: ICD-10-CM

## 2024-04-10 DIAGNOSIS — R73.03 PREDIABETES.: ICD-10-CM

## 2024-04-10 DIAGNOSIS — K90.41 NON-CELIAC GLUTEN SENSITIVITY: ICD-10-CM

## 2024-04-10 PROCEDURE — 99385 PREV VISIT NEW AGE 18-39: CPT

## 2024-04-10 RX ORDER — DEXTROAMPHETAMINE SACCHARATE, AMPHETAMINE ASPARTATE, DEXTROAMPHETAMINE SULFATE, AND AMPHETAMINE SULFATE 7.5; 7.5; 7.5; 7.5 MG/1; MG/1; MG/1; MG/1
30 TABLET ORAL
Refills: 0 | Status: DISCONTINUED | COMMUNITY
End: 2024-04-10

## 2024-04-10 RX ORDER — LEVONORGESTREL 19.5 MG/1
INTRAUTERINE DEVICE INTRAUTERINE
Refills: 0 | Status: DISCONTINUED | COMMUNITY
End: 2024-04-10

## 2024-04-10 SDOH — SOCIAL STABILITY - SOCIAL INSECURITY: DISSAPEARANCE AND DEATH OF FAMILY MEMBER: Z63.4

## 2024-04-11 PROBLEM — R53.83 FATIGUE, UNSPECIFIED TYPE: Status: ACTIVE | Noted: 2024-04-11

## 2024-04-11 PROBLEM — K90.41 GLUTEN INTOLERANCE: Status: ACTIVE | Noted: 2021-09-30

## 2024-04-11 PROBLEM — Z00.00 ANNUAL PHYSICAL EXAM: Status: ACTIVE | Noted: 2024-04-10

## 2024-04-11 PROBLEM — R73.03 PREDIABETES: Status: ACTIVE | Noted: 2024-04-10

## 2024-04-11 PROBLEM — Z63.4 BEREAVEMENT: Status: RESOLVED | Noted: 2021-12-22 | Resolved: 2024-04-11

## 2024-04-11 NOTE — HISTORY OF PRESENT ILLNESS
[de-identified] : BRUCE MENJIVAR is a 28 year F who presents today to establish with [facility] @ [place]. She is here today for an Annual Physical Exam. She has a past history of ADD and anxiety. She is medicated for these conditions. She also has a past medical history of Obesity and has undergone Bariatric surgery. She is overall well, but reports daily fatigue. She has previously undergone  sleep study assessment more than once , but found not to have NOREEN.

## 2024-04-11 NOTE — HEALTH RISK ASSESSMENT
[Yes] : Yes [2 - 4 times a month (2 pts)] : 2-4 times a month (2 points) [1 or 2 (0 pts)] : 1 or 2 (0 points) [Never (0 pts)] : Never (0 points) [No] : In the past 12 months have you used drugs other than those required for medical reasons? No [2] : 2) Feeling down, depressed, or hopeless for more than half of the days (2) [1/2 of Days or More (2)] : 4.) Feeling tired or having little energy? Half the days or more [Several Days (1)] : 6.) Feeling bad about yourself, or that you are a failure, or have let yourself or your family down? Several days [Nearly Every Day (3)] : 7.) Trouble concentrating on things, such as reading a newspaper or watching television? Nearly every day [Not at All (0)] : 9.) Thoughts that you would be off dead or of hurting yourself in some way? Not at all [Moderate] : Severity of Depression is Moderate [Patient reported PAP Smear was normal] : Patient reported PAP Smear was normal [With Family] : lives with family [# of Members in Household ___] :  household currently consist of [unfilled] member(s) [College] : College [Significant Other] : lives with significant other [# Of Children ___] : has [unfilled] children [Fully functional (bathing, dressing, toileting, transferring, walking, feeding)] : Fully functional (bathing, dressing, toileting, transferring, walking, feeding) [Fully functional (using the telephone, shopping, preparing meals, housekeeping, doing laundry, using] : Fully functional and needs no help or supervision to perform IADLs (using the telephone, shopping, preparing meals, housekeeping, doing laundry, using transportation, managing medications and managing finances) [Smoke Detector] : smoke detector [Carbon Monoxide Detector] : carbon monoxide detector [With Patient/Caregiver] : , with patient/caregiver [Name: ___] : Health Care Proxy's Name: [unfilled]  [Relationship: ___] : Relationship: [unfilled] [Never] : Never [PHQ-2 Positive] : PHQ-2 Positive [PHQ-9 Positive] : PHQ-9 Positive [de-identified] : walks daily- 3-4  miles [de-identified] : regular Adult Diet [DEQ0Rvqjy] : 4 [[id number]] : 13 [PapSmearDate] : 3/27/24 [AdvancecareDate] : 04/2024

## 2024-04-11 NOTE — REVIEW OF SYSTEMS
[Patient Intake Form Reviewed] : Patient intake form was reviewed [Fatigue] : fatigue [Recent Change In Weight] : ~T recent weight change [Negative] : Heme/Lymph [FreeTextEntry7] : fecal incontinence

## 2024-04-11 NOTE — PHYSICAL EXAM
[No Carotid Bruits] : no carotid bruits [No Abdominal Bruit] : a ~M bruit was not heard ~T in the abdomen [No Edema] : there was no peripheral edema [No Palpable Aorta] : no palpable aorta [Soft] : abdomen soft [Non Tender] : non-tender [Normal Bowel Sounds] : normal bowel sounds [Normal Supraclavicular Nodes] : no supraclavicular lymphadenopathy [Normal Anterior Cervical Nodes] : no anterior cervical lymphadenopathy [Normal] : no CVA or spinal tenderness [Grossly Normal Strength/Tone] : grossly normal strength/tone [No Focal Deficits] : no focal deficits [Normal Gait] : normal gait [Speech Grossly Normal] : speech grossly normal [Memory Grossly Normal] : memory grossly normal [Normal Affect] : the affect was normal [Normal Mood] : the mood was normal [de-identified] :  Overweight Young female [de-identified] : deferred sees GYN for exam

## 2024-04-18 ENCOUNTER — APPOINTMENT (OUTPATIENT)
Dept: PSYCHIATRY | Facility: CLINIC | Age: 29
End: 2024-04-18
Payer: COMMERCIAL

## 2024-04-18 DIAGNOSIS — F32.A DEPRESSION, UNSPECIFIED: ICD-10-CM

## 2024-04-18 PROCEDURE — 99214 OFFICE O/P EST MOD 30 MIN: CPT

## 2024-04-18 NOTE — REASON FOR VISIT
[Patient preference] : as per patient preference [Continuity of care] : to ensure continuity of care [Telehealth (audio & video) - Individual/Group] : This visit was provided via telehealth using real-time 2-way audio visual technology. [Medical Office: ([facility])___] : The provider was located at the medical office in [unfilled]. [Other Location: e.g. Home (Enter Location, City,State)___] : The patient, [unfilled], was located at [unfilled] at the time of the visit. [Verbal consent obtained from patient/other participant(s)] : Verbal consent for telehealth/telephonic services obtained from patient/other participant(s) [Patient] : Patient [Continuing, patient seen in-person within last 12 months] : Telehealth services are continuing as patient has been seen in-person within last 12 months. [FreeTextEntry4] : 4:30 pm  [FreeTextEntry5] : 5:00pm [FreeTextEntry2] : 03/18/2024 [FreeTextEntry1] : Pt seen for management of anxiety and executive functioning.

## 2024-04-18 NOTE — DISCUSSION/SUMMARY
[FreeTextEntry1] : Pt blood work pending.  I stop checked.  A	Y	Y	S	03/18/2024	03/19/2024	dextroamp-amphet er 30 mg cap	30	30	Sanjuana Nino MD	[id number]	[facility] A	Y	Y	S	03/18/2024	03/19/2024	dextroamp-amphetamin 10 mg tab	30	30	Sanjuana Nino MD	[id number]	[facility]

## 2024-04-18 NOTE — PLAN
[Yes. details: ___] : Yes, [unfilled] [Medication education provided] : Medication education provided. [Rationale for medication choices, possible risks/precautions, benefits, alternative treatment choices, and consequences of non-treatment discussed] : Rationale for medication choices, possible risks/precautions, benefits, alternative treatment choices, and consequences of non-treatment discussed with patient/family/caregiver  [FreeTextEntry4] : Meeting goals of care.  [FreeTextEntry5] : Pt agreed to decrease Pristiq to 75 mg, pt would want to be on the lowest effective dose and see if this is related to fatigue as she takes Pristiq at bedtime.  Continue Buspar 15 mg bid- pt feels her Adderall works fine despite being ER.  Continue Adderall ER 30 mg and 10 mg prn in the afternoon.  Pt did change contraception and there is concern re weight gain with medications as well,? if the ER formulation is problematic in terms of absorption. (pt has a gastric sleeve).

## 2024-04-18 NOTE — HISTORY OF PRESENT ILLNESS
[FreeTextEntry1] : Pt reports even with the drug holidays, she is less productive and does not get as much done.  Consider change in Pristiq as this too can contribute to fatigue.  Pt does take this at bedtime.  Pt started this with PCP and slowly increased with her PCP. Per pt the increase in fatigue may be secondary to the increase in dose of Pristiq (due to timeline of onset of symptoms and med dosage change).

## 2024-04-18 NOTE — PHYSICAL EXAM
[Cooperative] : cooperative [Anxious] : anxious [Full] : full [Clear] : clear [Linear/Goal Directed] : linear/goal directed [None] : none [None Reported] : none reported [Average] : average [WNL] : within normal limits [FreeTextEntry1] : Blonde female looks her stated age.  [FreeTextEntry8] : mild appropriate anxiety.

## 2024-05-06 ENCOUNTER — APPOINTMENT (OUTPATIENT)
Dept: OBGYN | Facility: CLINIC | Age: 29
End: 2024-05-06

## 2024-06-20 RX ORDER — DEXTROAMPHETAMINE SACCHARATE, AMPHETAMINE ASPARTATE, DEXTROAMPHETAMINE SULFATE AND AMPHETAMINE SULFATE 2.5; 2.5; 2.5; 2.5 MG/1; MG/1; MG/1; MG/1
10 TABLET ORAL
Qty: 30 | Refills: 0 | Status: ACTIVE | COMMUNITY
Start: 2022-06-23 | End: 1900-01-01

## 2024-07-01 ENCOUNTER — APPOINTMENT (OUTPATIENT)
Dept: PSYCHIATRY | Facility: CLINIC | Age: 29
End: 2024-07-01
Payer: COMMERCIAL

## 2024-07-01 DIAGNOSIS — F90.2 ATTENTION-DEFICIT HYPERACTIVITY DISORDER, COMBINED TYPE: ICD-10-CM

## 2024-07-01 DIAGNOSIS — F43.10 POST-TRAUMATIC STRESS DISORDER, UNSPECIFIED: ICD-10-CM

## 2024-07-01 PROCEDURE — 99214 OFFICE O/P EST MOD 30 MIN: CPT | Mod: 95

## 2024-08-01 ENCOUNTER — APPOINTMENT (OUTPATIENT)
Dept: OBGYN | Facility: CLINIC | Age: 29
End: 2024-08-01

## 2024-08-19 ENCOUNTER — NON-APPOINTMENT (OUTPATIENT)
Age: 29
End: 2024-08-19

## 2024-08-29 ENCOUNTER — APPOINTMENT (OUTPATIENT)
Dept: ANTEPARTUM | Facility: CLINIC | Age: 29
End: 2024-08-29

## 2024-08-29 ENCOUNTER — APPOINTMENT (OUTPATIENT)
Dept: OBGYN | Facility: CLINIC | Age: 29
End: 2024-08-29

## 2024-08-30 ENCOUNTER — TRANSCRIPTION ENCOUNTER (OUTPATIENT)
Age: 29
End: 2024-08-30

## 2024-09-06 ENCOUNTER — NON-APPOINTMENT (OUTPATIENT)
Age: 29
End: 2024-09-06

## 2024-09-27 ENCOUNTER — APPOINTMENT (OUTPATIENT)
Dept: OBGYN | Facility: CLINIC | Age: 29
End: 2024-09-27

## 2024-11-06 ENCOUNTER — APPOINTMENT (OUTPATIENT)
Dept: PSYCHIATRY | Facility: CLINIC | Age: 29
End: 2024-11-06
Payer: COMMERCIAL

## 2024-11-06 DIAGNOSIS — F43.10 POST-TRAUMATIC STRESS DISORDER, UNSPECIFIED: ICD-10-CM

## 2024-11-06 DIAGNOSIS — F90.2 ATTENTION-DEFICIT HYPERACTIVITY DISORDER, COMBINED TYPE: ICD-10-CM

## 2024-11-06 PROCEDURE — 99214 OFFICE O/P EST MOD 30 MIN: CPT

## 2024-12-02 ENCOUNTER — APPOINTMENT (OUTPATIENT)
Dept: PSYCHIATRY | Facility: CLINIC | Age: 29
End: 2024-12-02
Payer: COMMERCIAL

## 2024-12-02 DIAGNOSIS — F32.A DEPRESSION, UNSPECIFIED: ICD-10-CM

## 2024-12-02 DIAGNOSIS — F90.2 ATTENTION-DEFICIT HYPERACTIVITY DISORDER, COMBINED TYPE: ICD-10-CM

## 2024-12-02 PROCEDURE — 99214 OFFICE O/P EST MOD 30 MIN: CPT | Mod: 95

## 2025-01-14 ENCOUNTER — APPOINTMENT (OUTPATIENT)
Dept: PSYCHIATRY | Facility: CLINIC | Age: 30
End: 2025-01-14
Payer: COMMERCIAL

## 2025-01-14 DIAGNOSIS — F43.10 POST-TRAUMATIC STRESS DISORDER, UNSPECIFIED: ICD-10-CM

## 2025-01-14 DIAGNOSIS — F90.2 ATTENTION-DEFICIT HYPERACTIVITY DISORDER, COMBINED TYPE: ICD-10-CM

## 2025-01-14 DIAGNOSIS — F32.A DEPRESSION, UNSPECIFIED: ICD-10-CM

## 2025-01-14 PROCEDURE — 99214 OFFICE O/P EST MOD 30 MIN: CPT | Mod: 95

## 2025-02-17 NOTE — ED PROVIDER NOTE - DR. NAME
Initial /CM Assessment/Plan of Care Note     Baseline Assessment  71 year old admitted 2/13/2025 as Inpatient with a diagnosis of Plueurral effusion.   Prior to admission patient was living with Alone and residing at [facility]    .  Patient does not  have a Power of  for Healthcare.  Document is not activated. Patient’s Primary Care Provider is Evens Becerra DO.     Progress Note  Met with pt who reports she lives alone and was independent prior to admission. Pt reports no DM. Pt reports no Hx of HH or JOHNY placement. Pt has Rx coverage. Pt plans to return home.     Plan  Patient/Family Discharge Goal: Home   Is patient/family goal achievable: Yes    SW/CM - Recommendations for Discharge: Home     Barriers to Discharge  Identified Barriers to Discharge/Transition Planning:          Anticipate patient will not need post-hospital services. Necessary services are available.  Anticipate patient can return to the environment from which patient entered the hospital.   Anticipate patient can provide self-care at discharge.    Refer to / Flowsheet for objective data.     Medical History  Past Medical History:   Diagnosis Date    Diabetes mellitus  (CMD)     not on any meds    Endometrial carcinoma  (CMD)     Essential (primary) hypertension     Gout     High cholesterol     Malignant neoplasm  (CMD)     Non Hodgkin's lymphoma (CMD)     Patient states she is in remission from chemo, on IVIG every 6 weeks.  Last dose 9/13/24    Post-menopausal bleeding     Last blood transfusion was 9/18/24 for HGB of 6.6       Prior to Admission Status  Functional Status  Ambulation: Independent/Self  Bathing: Independent/Self  Dressing: Independent/Self  Toileting: Independent/Self  Meal Preparation: Independent/Self  Shopping: Independent/Self  Medication Preparation: Independent/Self  Medication Administration: Independent/Self  Housekeeping: Independent/Self  Laundry: Independent/Self  Transportation:  Independent/Self    Agency/Support  Type of Services Prior to Hospitalization: None               Support Systems: Siblings, Children, Family members   Home Devices/Equipment: Blood pressure monitor           Mobility Assist Devices: None  Sensory Support Devices: None    Prior Function                Current Function  Last Filed Values       None            Therapy Recommendations for Discharge:   PT:      Last Filed Values       None          OT:       Last Filed Values       None          SLP:    Last Filed Values       None            Insurance  Primary: [employer] MEDICARE ADVANTAGE  Secondary: N/A    Disposition Recommendations:  SW/CM recommendation for discharge: Home            Gretchen

## 2025-03-21 ENCOUNTER — NON-APPOINTMENT (OUTPATIENT)
Age: 30
End: 2025-03-21

## 2025-04-17 ENCOUNTER — APPOINTMENT (OUTPATIENT)
Dept: PSYCHIATRY | Facility: CLINIC | Age: 30
End: 2025-04-17

## 2025-05-27 ENCOUNTER — APPOINTMENT (OUTPATIENT)
Dept: PSYCHIATRY | Facility: CLINIC | Age: 30
End: 2025-05-27
Payer: COMMERCIAL

## 2025-05-27 DIAGNOSIS — F90.2 ATTENTION-DEFICIT HYPERACTIVITY DISORDER, COMBINED TYPE: ICD-10-CM

## 2025-05-27 DIAGNOSIS — F43.10 POST-TRAUMATIC STRESS DISORDER, UNSPECIFIED: ICD-10-CM

## 2025-05-27 PROCEDURE — 99214 OFFICE O/P EST MOD 30 MIN: CPT

## 2025-06-18 ENCOUNTER — NON-APPOINTMENT (OUTPATIENT)
Age: 30
End: 2025-06-18

## 2025-06-28 ENCOUNTER — INPATIENT (INPATIENT)
Facility: HOSPITAL | Age: 30
LOS: 0 days | Discharge: HOME | DRG: 552 | End: 2025-06-29
Attending: INTERNAL MEDICINE | Admitting: INTERNAL MEDICINE
Payer: COMMERCIAL

## 2025-06-28 VITALS — WEIGHT: 254.85 LBS

## 2025-06-28 DIAGNOSIS — Z90.3 ACQUIRED ABSENCE OF STOMACH [PART OF]: Chronic | ICD-10-CM

## 2025-06-28 PROCEDURE — 99285 EMERGENCY DEPT VISIT HI MDM: CPT

## 2025-06-28 RX ORDER — DEXAMETHASONE 0.5 MG/1
6 TABLET ORAL ONCE
Refills: 0 | Status: COMPLETED | OUTPATIENT
Start: 2025-06-28 | End: 2025-06-28

## 2025-06-28 RX ORDER — KETOROLAC TROMETHAMINE 30 MG/ML
30 INJECTION, SOLUTION INTRAMUSCULAR; INTRAVENOUS ONCE
Refills: 0 | Status: DISCONTINUED | OUTPATIENT
Start: 2025-06-28 | End: 2025-06-28

## 2025-06-28 RX ORDER — HYDROMORPHONE/SOD CHLOR,ISO/PF 2 MG/10 ML
1 SYRINGE (ML) INJECTION ONCE
Refills: 0 | Status: DISCONTINUED | OUTPATIENT
Start: 2025-06-28 | End: 2025-06-28

## 2025-06-28 RX ORDER — LIDOCAINE HYDROCHLORIDE 20 MG/ML
1 JELLY TOPICAL ONCE
Refills: 0 | Status: COMPLETED | OUTPATIENT
Start: 2025-06-28 | End: 2025-06-28

## 2025-06-28 RX ORDER — ONDANSETRON HCL/PF 4 MG/2 ML
4 VIAL (ML) INJECTION ONCE
Refills: 0 | Status: COMPLETED | OUTPATIENT
Start: 2025-06-28 | End: 2025-06-28

## 2025-06-28 RX ADMIN — Medication 4 MILLIGRAM(S): at 21:24

## 2025-06-28 RX ADMIN — LIDOCAINE HYDROCHLORIDE 1 PATCH: 20 JELLY TOPICAL at 21:25

## 2025-06-28 RX ADMIN — DEXAMETHASONE 6 MILLIGRAM(S): 0.5 TABLET ORAL at 21:24

## 2025-06-28 RX ADMIN — Medication 1 MILLIGRAM(S): at 22:35

## 2025-06-28 RX ADMIN — KETOROLAC TROMETHAMINE 30 MILLIGRAM(S): 30 INJECTION, SOLUTION INTRAMUSCULAR; INTRAVENOUS at 21:24

## 2025-06-28 NOTE — ED STATDOCS - PHYSICAL EXAMINATION
Physical Exam:  Gen: NAD, non-toxic appearing, able to ambulate without assistance  Head: NCAT  HEENT: EOMI, PEERLA, normal conjunctiva, tongue midline, oral mucosa moist  Lung: CTAB, no respiratory distress, no wheezes/rhonchi/rales B/L, speaking in full sentences  CV: RRR, no murmurs, rubs or gallops, distal pulses 2+ b/l  Abd: soft, nontender, no distention, no guarding, no rigidity, no rebound tenderness  MSK: no visible deformities, ROM normal in UE/LE. left lumbar and SI TTP.   Skin: Warm, well perfused, no rash  Psych: normal affect, calm

## 2025-06-28 NOTE — ED STATDOCS - PROGRESS NOTE DETAILS
signed Cecille Green PA-C Pt seen initially in intake by Dr Abraham.   30F with severe left sided low back pain radiating down LLE, began when pt made a twisting motion. Pt says she had a minor back injury/pain a few weeks ago but it wasn't that bad and improved with NSAIDs. Pt says her pain didn't improve with initial medications. Will give dilaudid and reassess. Pt agrees with plan of  care. Boyfriend driving her home. signed Cecille Green PA-C   Pt still in severe pain after dilaudid. She says the pain is tolerable if she lays perfectly still but as soon as she moves she has severe sharp pain radiating down LLE and can't move. Pt unable to even sit up in bed. Will order CT and labs, pt may require admission for intractable pain. Pt agrees with plan of  care. Case endorsed to night team. YVONNE Britt: received signout from YVONNE Green. Briefly, 29 y/o F with severe left lower back pain with radiation down LLE that started just prior to arrival to the ED after making a twisting motion.   Pt received dilaudid. Pt reassessed. States that pain has not improved at all. Pending lab results and CT. Kingsley GORE: Received sign out from Dr. Lou pending ct lumbar spine, pt with intractable pain, unable to ambulate, plan was to admit post ct. Spoke with Ping Arce. Hospitalist admission of patient appreciated. MR order placed, electronic consult for ortho spine placed. ms, rtm.

## 2025-06-28 NOTE — ED STATDOCS - NS_ ATTENDINGSCRIBEDETAILS _ED_A_ED_FT
I Marcos Wynn MD saw and examined the patient. Scribe documented for me and under my supervision. I have modified the scribe's documentation where necessary to reflect my history, physical exam and other relevant documentations pertinent to the care of the patient.

## 2025-06-28 NOTE — ED ADULT NURSE NOTE - OBJECTIVE STATEMENT
Pt comes to the ED complaining of back pain. Pt states that she twisted and she now has pain in her back that radiates down her left leg. Pt injured her back lifting something heavy approx 2 weeks ago and had pain at that time, but never sought evaluation by a medical professional as the pain resolved within 2 days and she has not had any issues since.

## 2025-06-28 NOTE — ED STATDOCS - CLINICAL SUMMARY MEDICAL DECISION MAKING FREE TEXT BOX
Received sign out from Dr. Lou pending ct lumbar spine, pt with intractable pain, unable to ambulate, plan was to admit post ct. Pt with no saddle anesthesia, incontinence of urine or stool, and 5/5 strength in both legs. Updated pt with the results of the labs and imaging. Spoke with Ping Arce. Hospitalist admission of patient appreciated. MR order placed, electronic consult for ortho spine placed. ms, rtm.

## 2025-06-28 NOTE — ED ADULT TRIAGE NOTE - CHIEF COMPLAINT QUOTE
Pt ambulatory to ED c/o L sided back pain radiating down L leg. Pt reports twisting to the side prior to pain starting. Denies falls/trauma. Pt took 1000mg tylenol x1 hr ago.

## 2025-06-28 NOTE — ED STATDOCS - ATTENDING APP SHARED VISIT CONTRIBUTION OF CARE
I Marcos Wynn MD saw and examined the patient. MLP saw and examined the patient under my supervision. I discussed the care of the patient with MLP and agree with MLP's plan, assessment and care of the patient while in the ED.

## 2025-06-28 NOTE — ED STATDOCS - OBJECTIVE STATEMENT
30 year old female with no pertinent PMHx presents to ED c/o left sided back pain onset 1 hour PTA. patient states that she twisted her torso and suddenly had the onset of sharp shooting pains to the left side going down to the foot with associated numbness. patient reports taking Tylenol extra strength PTA.

## 2025-06-28 NOTE — ED STATDOCS - DIFFERENTIAL DIAGNOSIS
Differential Diagnosis Received sign out from Dr. Lou pending ct lumbar spine, pt with intractable pain, unable to ambulate, plan was to admit post ct. Pt with no saddle anesthesia, incontinence of urine or stool, and 5/5 strength in both legs. Updated pt with the results of the labs and imaging. Spoke with Ping Arce. Hospitalist admission of patient appreciated. MR order placed, electronic consult for ortho spine placed. ms, rtm.

## 2025-06-28 NOTE — ED ADULT NURSE NOTE - NS ED NURSE LEVEL OF CONSCIOUSNESS ORIENTATION
I have personally seen and examined this patient.  I have fully participated in the care of this patient. I have reviewed all pertinent clinical information, including history, physical exam, plan and the Resident’s note and agree except as noted. Oriented - self; Oriented - place; Oriented - time

## 2025-06-28 NOTE — ED ADULT NURSE NOTE - PAIN: PRESENCE, MLM
--Pain management as needed  -Advance as per protocol  -OOB and ambulate  -f/u Rpt CBC in AM  -DC castillo f/u void  -Case management for pmhx of chronic HTN  -Social work for separation from    -Patient seen and examined with roger Penaloza attending complains of pain/discomfort

## 2025-06-29 VITALS
RESPIRATION RATE: 18 BRPM | SYSTOLIC BLOOD PRESSURE: 107 MMHG | OXYGEN SATURATION: 95 % | HEART RATE: 73 BPM | DIASTOLIC BLOOD PRESSURE: 52 MMHG | TEMPERATURE: 98 F

## 2025-06-29 DIAGNOSIS — M54.9 DORSALGIA, UNSPECIFIED: ICD-10-CM

## 2025-06-29 LAB
ALBUMIN SERPL ELPH-MCNC: 3.4 G/DL — SIGNIFICANT CHANGE UP (ref 3.3–5)
ALP SERPL-CCNC: 67 U/L — SIGNIFICANT CHANGE UP (ref 40–120)
ALT FLD-CCNC: 31 U/L — SIGNIFICANT CHANGE UP (ref 12–78)
ANION GAP SERPL CALC-SCNC: 6 MMOL/L — SIGNIFICANT CHANGE UP (ref 5–17)
APTT BLD: 31.4 SEC — SIGNIFICANT CHANGE UP (ref 26.1–36.8)
AST SERPL-CCNC: 18 U/L — SIGNIFICANT CHANGE UP (ref 15–37)
BASOPHILS # BLD AUTO: 0.01 K/UL — SIGNIFICANT CHANGE UP (ref 0–0.2)
BASOPHILS NFR BLD AUTO: 0.1 % — SIGNIFICANT CHANGE UP (ref 0–2)
BILIRUB SERPL-MCNC: 0.2 MG/DL — SIGNIFICANT CHANGE UP (ref 0.2–1.2)
BLD GP AB SCN SERPL QL: SIGNIFICANT CHANGE UP
BUN SERPL-MCNC: 9 MG/DL — SIGNIFICANT CHANGE UP (ref 7–23)
CALCIUM SERPL-MCNC: 9 MG/DL — SIGNIFICANT CHANGE UP (ref 8.5–10.1)
CHLORIDE SERPL-SCNC: 107 MMOL/L — SIGNIFICANT CHANGE UP (ref 96–108)
CO2 SERPL-SCNC: 25 MMOL/L — SIGNIFICANT CHANGE UP (ref 22–31)
CREAT SERPL-MCNC: 0.8 MG/DL — SIGNIFICANT CHANGE UP (ref 0.5–1.3)
EGFR: 102 ML/MIN/1.73M2 — SIGNIFICANT CHANGE UP
EGFR: 102 ML/MIN/1.73M2 — SIGNIFICANT CHANGE UP
EOSINOPHIL # BLD AUTO: 0 K/UL — SIGNIFICANT CHANGE UP (ref 0–0.5)
EOSINOPHIL NFR BLD AUTO: 0 % — SIGNIFICANT CHANGE UP (ref 0–6)
GLUCOSE SERPL-MCNC: 119 MG/DL — HIGH (ref 70–99)
HCG SERPL-ACNC: <1 MIU/ML — SIGNIFICANT CHANGE UP
HCT VFR BLD CALC: 38.5 % — SIGNIFICANT CHANGE UP (ref 34.5–45)
HGB BLD-MCNC: 12.8 G/DL — SIGNIFICANT CHANGE UP (ref 11.5–15.5)
IMM GRANULOCYTES # BLD AUTO: 0.03 K/UL — SIGNIFICANT CHANGE UP (ref 0–0.07)
IMM GRANULOCYTES NFR BLD AUTO: 0.3 % — SIGNIFICANT CHANGE UP (ref 0–0.9)
INR BLD: 0.93 RATIO — SIGNIFICANT CHANGE UP (ref 0.85–1.16)
LYMPHOCYTES # BLD AUTO: 1.37 K/UL — SIGNIFICANT CHANGE UP (ref 1–3.3)
LYMPHOCYTES NFR BLD AUTO: 15 % — SIGNIFICANT CHANGE UP (ref 13–44)
MCHC RBC-ENTMCNC: 30.1 PG — SIGNIFICANT CHANGE UP (ref 27–34)
MCHC RBC-ENTMCNC: 33.2 G/DL — SIGNIFICANT CHANGE UP (ref 32–36)
MCV RBC AUTO: 90.6 FL — SIGNIFICANT CHANGE UP (ref 80–100)
MONOCYTES # BLD AUTO: 0.22 K/UL — SIGNIFICANT CHANGE UP (ref 0–0.9)
MONOCYTES NFR BLD AUTO: 2.4 % — SIGNIFICANT CHANGE UP (ref 2–14)
NEUTROPHILS # BLD AUTO: 7.5 K/UL — HIGH (ref 1.8–7.4)
NEUTROPHILS NFR BLD AUTO: 82.2 % — HIGH (ref 43–77)
NRBC # BLD AUTO: 0 K/UL — SIGNIFICANT CHANGE UP (ref 0–0)
NRBC # FLD: 0 K/UL — SIGNIFICANT CHANGE UP (ref 0–0)
NRBC BLD AUTO-RTO: 0 /100 WBCS — SIGNIFICANT CHANGE UP (ref 0–0)
PLATELET # BLD AUTO: 253 K/UL — SIGNIFICANT CHANGE UP (ref 150–400)
PMV BLD: 10.1 FL — SIGNIFICANT CHANGE UP (ref 7–13)
POTASSIUM SERPL-MCNC: 4 MMOL/L — SIGNIFICANT CHANGE UP (ref 3.5–5.3)
POTASSIUM SERPL-SCNC: 4 MMOL/L — SIGNIFICANT CHANGE UP (ref 3.5–5.3)
PROT SERPL-MCNC: 6.9 GM/DL — SIGNIFICANT CHANGE UP (ref 6–8.3)
PROTHROM AB SERPL-ACNC: 11 SEC — SIGNIFICANT CHANGE UP (ref 9.9–13.4)
RBC # BLD: 4.25 M/UL — SIGNIFICANT CHANGE UP (ref 3.8–5.2)
RBC # FLD: 12.5 % — SIGNIFICANT CHANGE UP (ref 10.3–14.5)
SODIUM SERPL-SCNC: 138 MMOL/L — SIGNIFICANT CHANGE UP (ref 135–145)
WBC # BLD: 9.13 K/UL — SIGNIFICANT CHANGE UP (ref 3.8–10.5)
WBC # FLD AUTO: 9.13 K/UL — SIGNIFICANT CHANGE UP (ref 3.8–10.5)

## 2025-06-29 PROCEDURE — 99222 1ST HOSP IP/OBS MODERATE 55: CPT

## 2025-06-29 PROCEDURE — 93010 ELECTROCARDIOGRAM REPORT: CPT

## 2025-06-29 PROCEDURE — 72131 CT LUMBAR SPINE W/O DYE: CPT | Mod: 26

## 2025-06-29 PROCEDURE — 72148 MRI LUMBAR SPINE W/O DYE: CPT | Mod: 26

## 2025-06-29 PROCEDURE — 72148 MRI LUMBAR SPINE W/O DYE: CPT

## 2025-06-29 RX ORDER — IBUPROFEN 200 MG
1 TABLET ORAL
Qty: 0 | Refills: 0 | DISCHARGE
Start: 2025-06-29

## 2025-06-29 RX ORDER — ACETAMINOPHEN 500 MG/5ML
2 LIQUID (ML) ORAL
Qty: 0 | Refills: 0 | DISCHARGE
Start: 2025-06-29

## 2025-06-29 RX ORDER — CYCLOBENZAPRINE HYDROCHLORIDE 15 MG/1
1 CAPSULE, EXTENDED RELEASE ORAL
Qty: 21 | Refills: 0
Start: 2025-06-29 | End: 2025-07-05

## 2025-06-29 RX ORDER — DEXAMETHASONE 0.5 MG/1
10 TABLET ORAL EVERY 8 HOURS
Refills: 0 | Status: DISCONTINUED | OUTPATIENT
Start: 2025-06-29 | End: 2025-06-29

## 2025-06-29 RX ORDER — ACETAMINOPHEN 500 MG/5ML
650 LIQUID (ML) ORAL EVERY 6 HOURS
Refills: 0 | Status: DISCONTINUED | OUTPATIENT
Start: 2025-06-29 | End: 2025-06-29

## 2025-06-29 RX ORDER — IBUPROFEN 200 MG
600 TABLET ORAL EVERY 8 HOURS
Refills: 0 | Status: DISCONTINUED | OUTPATIENT
Start: 2025-06-29 | End: 2025-06-29

## 2025-06-29 RX ORDER — METHOCARBAMOL 500 MG/1
1500 TABLET, FILM COATED ORAL
Refills: 0 | Status: DISCONTINUED | OUTPATIENT
Start: 2025-06-29 | End: 2025-06-29

## 2025-06-29 RX ORDER — ONDANSETRON HCL/PF 4 MG/2 ML
4 VIAL (ML) INJECTION EVERY 8 HOURS
Refills: 0 | Status: DISCONTINUED | OUTPATIENT
Start: 2025-06-29 | End: 2025-06-29

## 2025-06-29 RX ORDER — MAGNESIUM, ALUMINUM HYDROXIDE 200-200 MG
30 TABLET,CHEWABLE ORAL EVERY 4 HOURS
Refills: 0 | Status: DISCONTINUED | OUTPATIENT
Start: 2025-06-29 | End: 2025-06-29

## 2025-06-29 RX ORDER — METHYLPREDNISOLONE ACETATE 80 MG/ML
24 INJECTION, SUSPENSION INTRA-ARTICULAR; INTRALESIONAL; INTRAMUSCULAR; SOFT TISSUE
Qty: 21 | Refills: 0
Start: 2025-06-29

## 2025-06-29 RX ORDER — MELATONIN 5 MG
3 TABLET ORAL AT BEDTIME
Refills: 0 | Status: DISCONTINUED | OUTPATIENT
Start: 2025-06-29 | End: 2025-06-29

## 2025-06-29 RX ORDER — HYDROMORPHONE/SOD CHLOR,ISO/PF 2 MG/10 ML
1 SYRINGE (ML) INJECTION EVERY 4 HOURS
Refills: 0 | Status: DISCONTINUED | OUTPATIENT
Start: 2025-06-29 | End: 2025-06-29

## 2025-06-29 RX ADMIN — Medication 1 MILLIGRAM(S): at 13:53

## 2025-06-29 RX ADMIN — Medication 1 MILLIGRAM(S): at 10:20

## 2025-06-29 RX ADMIN — LIDOCAINE HYDROCHLORIDE 1 PATCH: 20 JELLY TOPICAL at 09:25

## 2025-06-29 RX ADMIN — LIDOCAINE HYDROCHLORIDE 1 PATCH: 20 JELLY TOPICAL at 06:42

## 2025-06-29 RX ADMIN — METHOCARBAMOL 1500 MILLIGRAM(S): 500 TABLET, FILM COATED ORAL at 05:00

## 2025-06-29 RX ADMIN — Medication 1 MILLIGRAM(S): at 05:00

## 2025-06-29 RX ADMIN — DEXAMETHASONE 102 MILLIGRAM(S): 0.5 TABLET ORAL at 13:53

## 2025-06-29 RX ADMIN — Medication 600 MILLIGRAM(S): at 08:59

## 2025-06-29 RX ADMIN — Medication 1 MILLIGRAM(S): at 09:50

## 2025-06-29 RX ADMIN — Medication 600 MILLIGRAM(S): at 08:29

## 2025-06-29 RX ADMIN — Medication 1 MILLIGRAM(S): at 14:23

## 2025-06-29 RX ADMIN — Medication 1000 MILLILITER(S): at 00:26

## 2025-06-29 NOTE — ED ADULT NURSE REASSESSMENT NOTE - NS ED NURSE REASSESS COMMENT FT1
pt resting comfortably in stretcher, medicated for pain per MAR. Heat packs given to pt. Awaiting transport to .

## 2025-06-29 NOTE — H&P ADULT - ASSESSMENT
30/F with no medical history presents with back pain for one day     Back pain  Robaxin   IV pain control  Ibuprofen with food  ED doctor ordered MR and ortho spine consult  Pt ambulating in the ED after my exam   Ambulate with assistance     SCDs for DVT PPX     I spent 50 mins caring for this pt

## 2025-06-29 NOTE — DISCHARGE NOTE PROVIDER - NSDCFUSCHEDAPPT_GEN_ALL_CORE_FT
EMS Kirsten Orozco Physician [facility]  [address]MED [address] [facility] [address]  Scheduled Appointment: 07/22/2025    Sanjuana Nino Physician [facility]  PSYCHIATRY [address] [facility]  Scheduled Appointment: 07/30/2025     Ernesto Wade  [facility]  ORTHOSURG [address] [facility]  Scheduled Appointment: 07/21/2025    Kirsten Orozco  [facility]  [address]  Scheduled Appointment: 07/22/2025    Sanjuana Nino  [facility]  PSYCHIATRY [address] [facility]  Scheduled Appointment: 07/30/2025

## 2025-06-29 NOTE — DISCHARGE NOTE NURSING/CASE MANAGEMENT/SOCIAL WORK - PATIENT PORTAL LINK FT
You can access the FollowMyHealth Patient Portal offered by [facility] by registering at the following website: [email or web address]. By joining [facility]’s FollowMyHealth portal, you will also be able to view your health information using other applications (apps) compatible with our system.

## 2025-06-29 NOTE — H&P ADULT - HISTORY OF PRESENT ILLNESS
Patient is a 30y old  Female who presents with a chief complaint of back pain. She states that 3 weeks ago she injured her back while twisting It was painful but resolved. Yesterday she was twisting and hurt her back again. This time the pain was 8/10, sharp, radiated down the outside of her leg and was associated with inability to walk due to pain. She denies fevers, chills, hx of back surgery, IV drug use. Initially it was reported that she could not ambulate, however, she was able to ambulate to the bathroom shortly after my exam.     PAST MEDICAL & SURGICAL HISTORY:  Benign ovarian tumor      H/O gastric sleeve        FAMILY HISTORY:    Social History:      Allergies    No Known Allergies    Intolerances        MEDICATIONS  (STANDING):  HYDROmorphone  Injectable 1 milliGRAM(s) IV Push every 4 hours  ibuprofen  Tablet. 600 milliGRAM(s) Oral every 8 hours  methocarbamol 1500 milliGRAM(s) Oral two times a day    MEDICATIONS  (PRN):      ROS:  General:  No fevers, chills, or unexplained weight loss  Skin: No rash or bothersome skin lesions  Musculoskeletal: No arthalgias, myalgias or joint swelling  Eyes: No visual changes or eye pain  Ears: No hearing loss , otorrhea or ear pain  Nose, Mouth, Throat: No nasal congestion, rhinorrhea, oral lesions, postnasal drip or sore throat  Cardio: No chest pain or palpitations. no lower extremity edema. no syncope. no claudication.   Respiratory: No cough, shortness of breath or wheezing   GI: No diarrhea, constipation, blood in stools, abdominal pain, vomiting or heartburn  : No urinary frequency, hematuria, incontinence, or dysuria  Neurologic: No headaches, parasthesias, confusion, dysarthria or gait instability  Psychiatric:  No anxiety or depression  Lymphatic:  No easy bruising, easy bleeding or swollen glands  Allergic: No itching, sneezing , watery eyes, clear rhinorrhea or recurrent infections    PEx  T(C): 36.7 (06-29-25 @ 01:14), Max: 37.1 (06-28-25 @ 20:47)  HR: 68 (06-29-25 @ 01:14) (68 - 85)  BP: 114/77 (06-29-25 @ 01:14) (97/72 - 114/77)  RR: 18 (06-29-25 @ 01:14) (18 - 18)  SpO2: 95% (06-29-25 @ 01:14) (95% - 100%)  Wt(kg): --  General:     no acute distress, no identifying marks , scars, or tattoos.  Skin: no rash or prominent lesions  Head: normocephalic, atraumatic     Sinuses: non-tender  Nose: no external lesions, mucosa non-inflamed, septum and turbinates normal  Throat: no erythema, exudates or lesions.  Neck: Supple without lymphadenopathy. Thyroid no thyromegaly, no palpable thyroid nodules, no palpable nodules or masses, carotid arteries no bruits.   Breasts: No palpable masses or lesions.  Heart: RRR, no murmur or gallop.  Normal S1, S2.  No S3, S4.   Lungs: CTA bilaterally, no wheezes, rhonchi, rales.  Breathing unlabored.   Chest wall: Normal insp   Abdomen:  Soft, NT/ND, normal bowel sounds, no HSM, no masses.  No peritoneal signs.   Back: L back pain radiates to outside of L leg   : Exam normal.  no inguinal hernias.  Extremities: No deformities, clubbing, cyanosis, or edema.  Musculoskeletal: Normal gait and station. No decreased range of motion, instability, atrophy or abnormal strength or tone in the head, neck, spine, ribs, pelvis or extremities.   Neurologic: CN 2-12 normal. Sensation to pain, touch and proprioception normal. DTRs normal in upper and lower extremities. No pathologic reflexes.  Motor normal.  Psychiatric: Oriented X3, intact recent and remote memory, judgement and insight, normal mood and affect.                          12.8   9.13  )-----------( 253      ( 28 Jun 2025 23:47 )             38.5     06-28    138  |  107  |  9   ----------------------------<  119[H]  4.0   |  25  |  0.80    Ca    9.0      28 Jun 2025 23:47    TPro  6.9  /  Alb  3.4  /  TBili  0.2  /  DBili  x   /  AST  18  /  ALT  31  /  AlkPhos  67  06-28    CAPILLARY BLOOD GLUCOSE        PT/INR - ( 28 Jun 2025 23:47 )   PT: 11.0 sec;   INR: 0.93 ratio         PTT - ( 28 Jun 2025 23:47 )  PTT:31.4 sec  Urinalysis Basic - ( 28 Jun 2025 23:47 )    Color: x / Appearance: x / SG: x / pH: x  Gluc: 119 mg/dL / Ketone: x  / Bili: x / Urobili: x   Blood: x / Protein: x / Nitrite: x   Leuk Esterase: x / RBC: x / WBC x   Sq Epi: x / Non Sq Epi: x / Bacteria: x          Radiology/Imaging, I have personally reviewed:  ct L spine

## 2025-06-29 NOTE — DISCHARGE NOTE PROVIDER - NSDCFUADDAPPT_GEN_ALL_CORE_FT
APPTS ARE READY TO BE MADE: [ x ] YES    Best Family or Patient Contact (if needed):    Additional Information about above appointments (if needed):    1:   2:   3:     Other comments or requests:    APPTS ARE READY TO BE MADE: [ x ] YES    Best Family or Patient Contact (if needed):    Additional Information about above appointments (if needed):    1:   2:   3:     Other comments or requests:   Prior to outreaching the patient, it was visible that the patient has secured a follow up appointment which was not scheduled by our team.  07/07 3:00p with Dr. Wade; OrthoSurg at [address] [place] [address]

## 2025-06-29 NOTE — DISCHARGE NOTE PROVIDER - CARE PROVIDER_API CALL
Grary Huber  Orthopaedic Surgery  [facility], [address] [address]  Phone: [phone]  Fax: [phone]  Follow Up Time: 1 week

## 2025-06-29 NOTE — CONSULT NOTE ADULT - SUBJECTIVE AND OBJECTIVE BOX
Patient is a 30yFemale community ambulator who presented overnight for back pain. States she was lifting seomthing 3 weeks ago and had acute onset of back pain that resolved with tylenol, twisted yesterday and felt pain return. originally had left sided radicular pain and numbness to the foot, after pain meds and decadron in ED states this has resolved and now has pain localized to left low back and thigh. No numbness or tingling. Denies incontinence, saddle anesthesia. Able to ambulate. Denies having any other pain elsewhere. Denies any previous orthopedic history. No other orthopedic concerns at this time.    No pertinent past medical history    Benign ovarian tumor            No Known Allergies      PHYSICAL EXAM:  T(C): 36.7 (06-29-25 @ 08:00), Max: 37.1 (06-28-25 @ 20:47)  HR: 73 (06-29-25 @ 08:00) (68 - 85)  BP: 107/52 (06-29-25 @ 08:00) (97/72 - 114/77)  RR: 18 (06-29-25 @ 08:00) (16 - 18)  SpO2: 95% (06-29-25 @ 08:00) (95% - 100%)    Exam:    General: NAD    Spine:    No palpable step off. Nontender to palpation.     Motor:                   C5                C6              C7               C8           T1   R            5/5                5/5            5/5             5/5          5/5  L             5/5               5/5             5/5             5/5          5/5                L2             L3             L4               L5            S1  R         5/5           5/5          5/5             5/5           5/5  L          5/5          5/5           5/5             5/5           5/5    Sensory:            C5         C6         C7      C8       T1        (0=absent, 1=impaired, 2=normal, NT=not testable)  R         2            2           2        2         2  L          2            2           2        2         2               L2          L3         L4      L5       S1         (0=absent, 1=impaired, 2=normal, NT=not testable)  R         2            2            2        2        2  L          2            2           2        2         2    UMNs:    Babinski (-) B/L  Clonus (-) B/L  Terrell (-) B/L    Imaging < from: MR Lumbar Spine No Cont (06.29.25 @ 09:13) >  ACC: [id number] EXAM:  MR SPINE LUMBAR   ORDERED BY: FANY VILLALOBOS     PROCEDURE DATE:  06/29/2025          INTERPRETATION:  CLINICAL INFORMATION: Intractable back pain    ADDITIONAL CLINICAL INFORMATION: Not Applicable    TECHNIQUE: Multiplanar, multisequence MRI was performed of the lumbar   spine T11 to S1-2.  IV Contrast: NONE    PRIOR STUDIES: Lumbar spine CT 6/29/2025    The visualized thoracic and lumbosacral vertebral bodies are normal in   height and signal characteristics. There are Schmorl's nodes noted along   the superior and inferior endplates at multiple levels. Mild degenerative   disc disease is identified at L4-5 with loss of signal on the T2-weighted   images. No acute fractures or dislocations are identified.    L1-2 is unremarkable. L2-3 is unremarkable. At L3-4 there is a far   lateral left-sided disc herniation with mass effect on the left L3 nerve   root. L4-5 there is a mild annular bulge. L5-S1 is unremarkable. The   conus terminates normally at the T12-L1 level.    The paraspinal soft tissues are unremarkable.    IMPRESSION: Findings suggestive of far lateral left-sided disc herniation   L3-4 with mass effect on the left L3 nerve root. No acute fractures or   dislocations. No significant spinal stenosis.    --- End of Report ---            BHAVYA PICHARDO MD; Attending Radiologist  This document has been electronically signed. Jun 29 2025  9:23AM    < end of copied text >  < from: CT Lumbar Spine No Cont (06.29.25 @ 01:12) >  CC: [id number] EXAM:  CT LUMBAR SPINE   ORDERED BY: GEORGIA PERES     PROCEDURE DATE:  06/29/2025          INTERPRETATION:  CT LUMBAR SPINE:    CLINICAL INDICATION: Sciatica    TECHNIQUE: CT of the lumbar spine was performed without the   administration of intravenous contrast, according to standard protocol.    COMPARISON: CT abdomen 5/23/2023    FINDINGS:    ALIGNMENT: Straightening of the spine without listhesis.    VERTEBRAE: The vertebral bodies are normal in height. There is no   fracture or aggressive osseous lesion.    DISCS: The disc spaces are maintained.    EVALUATION OF INDIVIDUAL LEVELS DEMONSTRATES: See below:    T12-L1: No significant spinal canal stenosis or neuroforaminal narrowing.    L1-L2: Trace shallow disc bulge, without significant spinal canal   stenosis or neuroforaminal narrowing.    L2-L3: Shallow disc bulge results in mild spinal canal stenosis and mild   left-sided neuroforaminal narrowing.    L3-L4: Broad-based disc bulge results in mild spinal canal stenosis and   moderate left-sided neuroforaminal narrowing.    L4-L5: Broad-based disc bulge results in mild spinal canal stenosis and   mild bilateral neuroforaminal narrowing.    L5-S1: Right paracentral disc bulge, without significant spinal canal   stenosis. Mild right-sided neuroforaminal narrowing.    PARAVERTEBRAL SOFT TISSUES: The visualized paravertebral soft tissues   appear within normal limits.      IMPRESSION:  No acute traumatic injury to the lumbar spine.    Multilevel degenerative changes, described above. MRI could be considered   if symptoms persist.    --- End of Report ---            < end of copied text >      INCOMPLETE  A/P: 30F w L3-4 HNP  Discussed nature of disease, surgical versus conservative mgmt. At this time patient would like to proceed with conservative mgmt  Analgesia  WBAT  Had good response w decadron in ED- repeat dose today, DC on medrol dose pac  DVT ppx per primary  PT/OT  Ice and elevate as tolerated  No acute orthopedic surgical intervention indicated at this time  Orthopedically stable  FU Dr Huber in office this week  Will d/w Dr Huber and Update   Patient is a 30yFemale community ambulator who presented overnight for back pain. States she was lifting seomthing 3 weeks ago and had acute onset of back pain that resolved with tylenol, twisted yesterday and felt pain return. originally had left sided radicular pain and numbness to the foot, after pain meds and decadron in ED states this has resolved and now has pain localized to left low back and thigh. No numbness or tingling. Denies incontinence, saddle anesthesia. Able to ambulate. Denies having any other pain elsewhere. Denies any previous orthopedic history. No other orthopedic concerns at this time.    No pertinent past medical history    Benign ovarian tumor            No Known Allergies      PHYSICAL EXAM:  T(C): 36.7 (06-29-25 @ 08:00), Max: 37.1 (06-28-25 @ 20:47)  HR: 73 (06-29-25 @ 08:00) (68 - 85)  BP: 107/52 (06-29-25 @ 08:00) (97/72 - 114/77)  RR: 18 (06-29-25 @ 08:00) (16 - 18)  SpO2: 95% (06-29-25 @ 08:00) (95% - 100%)    Exam:    General: NAD    Spine:    No palpable step off. Nontender to palpation.     Motor:                   C5                C6              C7               C8           T1   R            5/5                5/5            5/5             5/5          5/5  L             5/5               5/5             5/5             5/5          5/5                L2             L3             L4               L5            S1  R         5/5           5/5          5/5             5/5           5/5  L          5/5          5/5           5/5             5/5           5/5    Sensory:            C5         C6         C7      C8       T1        (0=absent, 1=impaired, 2=normal, NT=not testable)  R         2            2           2        2         2  L          2            2           2        2         2               L2          L3         L4      L5       S1         (0=absent, 1=impaired, 2=normal, NT=not testable)  R         2            2            2        2        2  L          2            2           2        2         2    UMNs:    Babinski (-) B/L  Clonus (-) B/L  Terrell (-) B/L    Imaging < from: MR Lumbar Spine No Cont (06.29.25 @ 09:13) >  ACC: [id number] EXAM:  MR SPINE LUMBAR   ORDERED BY: FANY VILLALOBOS     PROCEDURE DATE:  06/29/2025          INTERPRETATION:  CLINICAL INFORMATION: Intractable back pain    ADDITIONAL CLINICAL INFORMATION: Not Applicable    TECHNIQUE: Multiplanar, multisequence MRI was performed of the lumbar   spine T11 to S1-2.  IV Contrast: NONE    PRIOR STUDIES: Lumbar spine CT 6/29/2025    The visualized thoracic and lumbosacral vertebral bodies are normal in   height and signal characteristics. There are Schmorl's nodes noted along   the superior and inferior endplates at multiple levels. Mild degenerative   disc disease is identified at L4-5 with loss of signal on the T2-weighted   images. No acute fractures or dislocations are identified.    L1-2 is unremarkable. L2-3 is unremarkable. At L3-4 there is a far   lateral left-sided disc herniation with mass effect on the left L3 nerve   root. L4-5 there is a mild annular bulge. L5-S1 is unremarkable. The   conus terminates normally at the T12-L1 level.    The paraspinal soft tissues are unremarkable.    IMPRESSION: Findings suggestive of far lateral left-sided disc herniation   L3-4 with mass effect on the left L3 nerve root. No acute fractures or   dislocations. No significant spinal stenosis.    --- End of Report ---            BHAVYA PICHARDO MD; Attending Radiologist  This document has been electronically signed. Jun 29 2025  9:23AM    < end of copied text >  < from: CT Lumbar Spine No Cont (06.29.25 @ 01:12) >  CC: [id number] EXAM:  CT LUMBAR SPINE   ORDERED BY: GEORGIA PERES     PROCEDURE DATE:  06/29/2025          INTERPRETATION:  CT LUMBAR SPINE:    CLINICAL INDICATION: Sciatica    TECHNIQUE: CT of the lumbar spine was performed without the   administration of intravenous contrast, according to standard protocol.    COMPARISON: CT abdomen 5/23/2023    FINDINGS:    ALIGNMENT: Straightening of the spine without listhesis.    VERTEBRAE: The vertebral bodies are normal in height. There is no   fracture or aggressive osseous lesion.    DISCS: The disc spaces are maintained.    EVALUATION OF INDIVIDUAL LEVELS DEMONSTRATES: See below:    T12-L1: No significant spinal canal stenosis or neuroforaminal narrowing.    L1-L2: Trace shallow disc bulge, without significant spinal canal   stenosis or neuroforaminal narrowing.    L2-L3: Shallow disc bulge results in mild spinal canal stenosis and mild   left-sided neuroforaminal narrowing.    L3-L4: Broad-based disc bulge results in mild spinal canal stenosis and   moderate left-sided neuroforaminal narrowing.    L4-L5: Broad-based disc bulge results in mild spinal canal stenosis and   mild bilateral neuroforaminal narrowing.    L5-S1: Right paracentral disc bulge, without significant spinal canal   stenosis. Mild right-sided neuroforaminal narrowing.    PARAVERTEBRAL SOFT TISSUES: The visualized paravertebral soft tissues   appear within normal limits.      IMPRESSION:  No acute traumatic injury to the lumbar spine.    Multilevel degenerative changes, described above. MRI could be considered   if symptoms persist.    --- End of Report ---            < end of copied text >      A/P: 30F w L3-4 HNP  Discussed nature of disease, surgical versus conservative mgmt. At this time patient would like to proceed with conservative mgmt  Analgesia  WBAT  Had good response w decadron in ED- repeat dose today  DC on medrol dose pac, cyclobenzaprine 5mg TID PRN for muscle spasm for 5 days  DVT ppx per primary  PT/OT  Ice and elevate as tolerated  No acute orthopedic surgical intervention indicated at this time  Orthopedically stable  FU Dr Huber in office this week  D/w Dr Huber who agrees

## 2025-06-29 NOTE — DISCHARGE NOTE PROVIDER - NSDCCPCAREPLAN_GEN_ALL_CORE_FT
PRINCIPAL DISCHARGE DIAGNOSIS  Diagnosis: Intractable back pain  Assessment and Plan of Treatment: due to L3-L4 Herniated disc.  take steroid medrol dose pack as prescribed  take muscle relaxer as prescribed  tylenol and ibuprofen as needed pain  follow up Dr. Huber 1 week

## 2025-06-29 NOTE — DISCHARGE NOTE PROVIDER - NSDCMRMEDTOKEN_GEN_ALL_CORE_FT
acetaminophen 325 mg oral tablet: 2 tab(s) orally every 6 hours As needed Temp greater or equal to 38C (100.4F), Mild Pain (1 - 3)  aluminum hydroxide/magnesium hydroxide/simethicone 200 mg-200 mg-20 mg/5 mL oral suspension: 10 milliliter(s) orally every 6 hours  cyclobenzaprine 5 mg oral tablet: 1 tab(s) orally 3 times a day  ibuprofen 600 mg oral tablet: 1 tab(s) orally every 8 hours as needed for  severe pain  Medrol Dosepak 4 mg oral tablet: 24 milligram(s) orally once a day 24mg qd x 1 day then  20mg qd x 1 day then  16mg qd x 1 day then  12mg qd x 1 day then  8mg qd x 1 day then  4mg qd x 1 day

## 2025-06-29 NOTE — H&P ADULT - NSHPPOAPRESSUREULCER_GEN_ALL_CORE
For information on Fall & Injury Prevention, visit: [email or web address] OR  [email or web address] OR  https://www.cdc.gov/steadi/patient.html no

## 2025-06-29 NOTE — DISCHARGE NOTE PROVIDER - HOSPITAL COURSE
Reason for Admission: back pain  History of Present Illness:   Patient is a 30y old  Female who presents with a chief complaint of back pain. She states that 3 weeks ago she injured her back while twisting It was painful but resolved. Yesterday she was twisting and hurt her back again. This time the pain was 8/10, sharp, radiated down the outside of her leg and was associated with inability to walk due to pain. She denies fevers, chills, hx of back surgery, IV drug use. Initially it was reported that she could not ambulate, however, she was able to ambulate to the bathroom shortly after my exam.     Hospital course:  Pt found to have L3-L4 herniated disc with mass effect L3 nerve root, seen on MRI.  Pt treated conservatively per ortho spine with muscle relaxers, steroids and opioids while inpatient.  Plan to follow up Dr. Huber 1 week.

## 2025-06-29 NOTE — DISCHARGE NOTE NURSING/CASE MANAGEMENT/SOCIAL WORK - FINANCIAL ASSISTANCE
[facility] provides services at a reduced cost to those who are determined to be eligible through [facility]’s financial assistance program. Information regarding [facility]’s financial assistance program can be found by going to [email or web address] or by calling [phone].

## 2025-07-03 DIAGNOSIS — Z98.84 BARIATRIC SURGERY STATUS: ICD-10-CM

## 2025-07-03 DIAGNOSIS — M51.26 OTHER INTERVERTEBRAL DISC DISPLACEMENT, LUMBAR REGION: ICD-10-CM

## 2025-07-07 ENCOUNTER — APPOINTMENT (OUTPATIENT)
Dept: ORTHOPEDIC SURGERY | Facility: CLINIC | Age: 30
End: 2025-07-07
Payer: COMMERCIAL

## 2025-07-07 ENCOUNTER — NON-APPOINTMENT (OUTPATIENT)
Age: 30
End: 2025-07-07

## 2025-07-07 VITALS — HEIGHT: 67 IN | BODY MASS INDEX: 38.14 KG/M2 | WEIGHT: 243 LBS

## 2025-07-07 PROBLEM — M51.26 LUMBAR HERNIATED DISC: Status: ACTIVE | Noted: 2025-07-07

## 2025-07-07 PROBLEM — M54.16 LUMBAR RADICULOPATHY: Status: ACTIVE | Noted: 2025-07-07

## 2025-07-07 PROCEDURE — 99203 OFFICE O/P NEW LOW 30 MIN: CPT

## 2025-07-07 RX ORDER — PREDNISONE 20 MG/1
20 TABLET ORAL DAILY
Qty: 18 | Refills: 0 | Status: ACTIVE | COMMUNITY
Start: 2025-07-07 | End: 1900-01-01

## 2025-07-07 RX ORDER — LIDOCAINE 5% 700 MG/1
5 PATCH TOPICAL
Qty: 1 | Refills: 3 | Status: ACTIVE | COMMUNITY
Start: 2025-07-07 | End: 1900-01-01

## 2025-07-08 ENCOUNTER — APPOINTMENT (OUTPATIENT)
Dept: PHYSICAL MEDICINE AND REHAB | Facility: CLINIC | Age: 30
End: 2025-07-08

## 2025-07-22 ENCOUNTER — APPOINTMENT (OUTPATIENT)
Dept: INTERNAL MEDICINE | Facility: CLINIC | Age: 30
End: 2025-07-22

## 2025-07-30 ENCOUNTER — APPOINTMENT (OUTPATIENT)
Dept: ORTHOPEDIC SURGERY | Facility: CLINIC | Age: 30
End: 2025-07-30

## 2025-07-30 ENCOUNTER — APPOINTMENT (OUTPATIENT)
Dept: PSYCHIATRY | Facility: CLINIC | Age: 30
End: 2025-07-30
Payer: COMMERCIAL

## 2025-07-30 DIAGNOSIS — F90.2 ATTENTION-DEFICIT HYPERACTIVITY DISORDER, COMBINED TYPE: ICD-10-CM

## 2025-07-30 DIAGNOSIS — F32.A DEPRESSION, UNSPECIFIED: ICD-10-CM

## 2025-07-30 PROCEDURE — 99214 OFFICE O/P EST MOD 30 MIN: CPT | Mod: 95

## 2025-09-10 ENCOUNTER — NON-APPOINTMENT (OUTPATIENT)
Age: 30
End: 2025-09-10